# Patient Record
Sex: MALE | Race: WHITE | Employment: OTHER | URBAN - METROPOLITAN AREA
[De-identification: names, ages, dates, MRNs, and addresses within clinical notes are randomized per-mention and may not be internally consistent; named-entity substitution may affect disease eponyms.]

---

## 2017-02-02 RX ORDER — LOSARTAN POTASSIUM AND HYDROCHLOROTHIAZIDE 12.5; 5 MG/1; MG/1
1 TABLET ORAL DAILY
Qty: 90 TABLET | Refills: 1 | Status: SHIPPED | OUTPATIENT
Start: 2017-02-02 | End: 2017-05-16 | Stop reason: SDUPTHER

## 2017-02-15 ENCOUNTER — OFFICE VISIT (OUTPATIENT)
Dept: FAMILY MEDICINE CLINIC | Age: 72
End: 2017-02-15

## 2017-02-15 VITALS
DIASTOLIC BLOOD PRESSURE: 62 MMHG | OXYGEN SATURATION: 93 % | RESPIRATION RATE: 18 BRPM | SYSTOLIC BLOOD PRESSURE: 120 MMHG | WEIGHT: 253 LBS | BODY MASS INDEX: 34.27 KG/M2 | TEMPERATURE: 98.3 F | HEART RATE: 90 BPM | HEIGHT: 72 IN

## 2017-02-15 DIAGNOSIS — J01.40 ACUTE NON-RECURRENT PANSINUSITIS: Primary | ICD-10-CM

## 2017-02-15 DIAGNOSIS — J20.9 ACUTE BRONCHITIS, UNSPECIFIED ORGANISM: ICD-10-CM

## 2017-02-15 PROCEDURE — 99213 OFFICE O/P EST LOW 20 MIN: CPT | Performed by: FAMILY MEDICINE

## 2017-02-15 RX ORDER — CIPROFLOXACIN 500 MG/1
500 TABLET, FILM COATED ORAL 2 TIMES DAILY
Qty: 20 TABLET | Refills: 0 | Status: SHIPPED | OUTPATIENT
Start: 2017-02-15 | End: 2017-02-25

## 2017-02-15 RX ORDER — METHYLPREDNISOLONE 4 MG/1
TABLET ORAL
Qty: 21 TABLET | Refills: 0 | Status: SHIPPED | OUTPATIENT
Start: 2017-02-15 | End: 2017-02-21

## 2017-02-15 ASSESSMENT — ENCOUNTER SYMPTOMS
EYES NEGATIVE: 1
FACIAL SWELLING: 0
APNEA: 0
COUGH: 1
WHEEZING: 1
SHORTNESS OF BREATH: 0
CHOKING: 0
RHINORRHEA: 1
SINUS PRESSURE: 1
CHEST TIGHTNESS: 1

## 2017-05-16 ENCOUNTER — OFFICE VISIT (OUTPATIENT)
Dept: FAMILY MEDICINE CLINIC | Age: 72
End: 2017-05-16

## 2017-05-16 VITALS
OXYGEN SATURATION: 95 % | BODY MASS INDEX: 33.46 KG/M2 | WEIGHT: 247 LBS | TEMPERATURE: 98.2 F | DIASTOLIC BLOOD PRESSURE: 82 MMHG | RESPIRATION RATE: 16 BRPM | HEIGHT: 72 IN | HEART RATE: 92 BPM | SYSTOLIC BLOOD PRESSURE: 136 MMHG

## 2017-05-16 DIAGNOSIS — I10 ESSENTIAL HYPERTENSION: Primary | ICD-10-CM

## 2017-05-16 DIAGNOSIS — R73.9 HYPERGLYCEMIA: ICD-10-CM

## 2017-05-16 DIAGNOSIS — E78.5 HYPERLIPIDEMIA, UNSPECIFIED HYPERLIPIDEMIA TYPE: ICD-10-CM

## 2017-05-16 DIAGNOSIS — R10.31 RLQ ABDOMINAL PAIN: ICD-10-CM

## 2017-05-16 LAB
ALBUMIN SERPL-MCNC: 4.5 G/DL (ref 3.4–5)
ALP BLD-CCNC: 66 U/L (ref 40–129)
ALT SERPL-CCNC: 30 U/L (ref 10–40)
ANION GAP SERPL CALCULATED.3IONS-SCNC: 20 MMOL/L (ref 3–16)
AST SERPL-CCNC: 26 U/L (ref 15–37)
BILIRUB SERPL-MCNC: 0.6 MG/DL (ref 0–1)
BILIRUBIN DIRECT: <0.2 MG/DL (ref 0–0.3)
BILIRUBIN, INDIRECT: NORMAL MG/DL (ref 0–1)
BUN BLDV-MCNC: 18 MG/DL (ref 7–20)
CALCIUM SERPL-MCNC: 9.2 MG/DL (ref 8.3–10.6)
CHLORIDE BLD-SCNC: 98 MMOL/L (ref 99–110)
CHOLESTEROL, TOTAL: 198 MG/DL (ref 0–199)
CO2: 21 MMOL/L (ref 21–32)
CREAT SERPL-MCNC: 1.1 MG/DL (ref 0.8–1.3)
GFR AFRICAN AMERICAN: >60
GFR NON-AFRICAN AMERICAN: >60
GLUCOSE BLD-MCNC: 116 MG/DL (ref 70–99)
HDLC SERPL-MCNC: 54 MG/DL (ref 40–60)
LDL CHOLESTEROL CALCULATED: 122 MG/DL
POTASSIUM SERPL-SCNC: 4.3 MMOL/L (ref 3.5–5.1)
SODIUM BLD-SCNC: 139 MMOL/L (ref 136–145)
TOTAL PROTEIN: 7.4 G/DL (ref 6.4–8.2)
TRIGL SERPL-MCNC: 110 MG/DL (ref 0–150)
VLDLC SERPL CALC-MCNC: 22 MG/DL

## 2017-05-16 PROCEDURE — 99214 OFFICE O/P EST MOD 30 MIN: CPT | Performed by: FAMILY MEDICINE

## 2017-05-16 PROCEDURE — 36415 COLL VENOUS BLD VENIPUNCTURE: CPT | Performed by: FAMILY MEDICINE

## 2017-05-16 RX ORDER — LOSARTAN POTASSIUM AND HYDROCHLOROTHIAZIDE 12.5; 5 MG/1; MG/1
1 TABLET ORAL DAILY
Qty: 90 TABLET | Refills: 1 | Status: SHIPPED | OUTPATIENT
Start: 2017-05-16 | End: 2017-10-23 | Stop reason: SDUPTHER

## 2017-05-16 RX ORDER — NIFEDIPINE 90 MG/1
TABLET, EXTENDED RELEASE ORAL
Qty: 90 TABLET | Refills: 1 | Status: SHIPPED | OUTPATIENT
Start: 2017-05-16 | End: 2017-10-23 | Stop reason: SDUPTHER

## 2017-05-16 RX ORDER — GUAIFENESIN AND CODEINE PHOSPHATE 100; 10 MG/5ML; MG/5ML
5 SOLUTION ORAL 3 TIMES DAILY PRN
COMMUNITY
End: 2018-01-12

## 2017-10-12 ENCOUNTER — OFFICE VISIT (OUTPATIENT)
Dept: FAMILY MEDICINE CLINIC | Age: 72
End: 2017-10-12

## 2017-10-12 VITALS
DIASTOLIC BLOOD PRESSURE: 74 MMHG | HEIGHT: 72 IN | WEIGHT: 245 LBS | BODY MASS INDEX: 33.18 KG/M2 | SYSTOLIC BLOOD PRESSURE: 122 MMHG | HEART RATE: 76 BPM

## 2017-10-12 DIAGNOSIS — J06.9 UPPER RESPIRATORY TRACT INFECTION, UNSPECIFIED TYPE: Primary | ICD-10-CM

## 2017-10-12 DIAGNOSIS — J40 BRONCHITIS: ICD-10-CM

## 2017-10-12 PROCEDURE — 99213 OFFICE O/P EST LOW 20 MIN: CPT | Performed by: FAMILY MEDICINE

## 2017-10-12 RX ORDER — GUAIFENESIN AND CODEINE PHOSPHATE 100; 10 MG/5ML; MG/5ML
SOLUTION ORAL
Qty: 240 ML | Refills: 1 | Status: SHIPPED | OUTPATIENT
Start: 2017-10-12 | End: 2017-10-19

## 2017-10-12 RX ORDER — CIPROFLOXACIN 500 MG/1
500 TABLET, FILM COATED ORAL 2 TIMES DAILY
Qty: 20 TABLET | Refills: 0 | Status: SHIPPED | OUTPATIENT
Start: 2017-10-12 | End: 2017-10-22

## 2017-10-12 RX ORDER — CYCLOBENZAPRINE HCL 10 MG
TABLET ORAL
Qty: 30 TABLET | Refills: 4 | Status: SHIPPED | OUTPATIENT
Start: 2017-10-12 | End: 2018-12-10

## 2017-10-12 RX ORDER — GUAIFENESIN AND CODEINE PHOSPHATE 100; 10 MG/5ML; MG/5ML
5 SOLUTION ORAL 3 TIMES DAILY PRN
Refills: 0 | Status: CANCELLED | OUTPATIENT
Start: 2017-10-12

## 2017-10-13 ENCOUNTER — TELEPHONE (OUTPATIENT)
Dept: FAMILY MEDICINE CLINIC | Age: 72
End: 2017-10-13

## 2017-10-23 RX ORDER — NIFEDIPINE 90 MG/1
TABLET, EXTENDED RELEASE ORAL
Qty: 90 TABLET | Refills: 1 | Status: SHIPPED | OUTPATIENT
Start: 2017-10-23 | End: 2017-11-16 | Stop reason: SDUPTHER

## 2017-10-23 RX ORDER — LOSARTAN POTASSIUM AND HYDROCHLOROTHIAZIDE 12.5; 5 MG/1; MG/1
1 TABLET ORAL DAILY
Qty: 90 TABLET | Refills: 1 | Status: SHIPPED | OUTPATIENT
Start: 2017-10-23 | End: 2017-11-16 | Stop reason: SDUPTHER

## 2017-11-16 ENCOUNTER — OFFICE VISIT (OUTPATIENT)
Dept: FAMILY MEDICINE CLINIC | Age: 72
End: 2017-11-16

## 2017-11-16 VITALS
DIASTOLIC BLOOD PRESSURE: 80 MMHG | BODY MASS INDEX: 33.18 KG/M2 | HEART RATE: 81 BPM | RESPIRATION RATE: 16 BRPM | OXYGEN SATURATION: 98 % | WEIGHT: 245 LBS | HEIGHT: 72 IN | SYSTOLIC BLOOD PRESSURE: 136 MMHG

## 2017-11-16 DIAGNOSIS — E78.5 HYPERLIPIDEMIA, UNSPECIFIED HYPERLIPIDEMIA TYPE: ICD-10-CM

## 2017-11-16 DIAGNOSIS — R73.9 HYPERGLYCEMIA: ICD-10-CM

## 2017-11-16 DIAGNOSIS — R35.1 NOCTURIA: ICD-10-CM

## 2017-11-16 DIAGNOSIS — I10 ESSENTIAL HYPERTENSION: Primary | ICD-10-CM

## 2017-11-16 DIAGNOSIS — Z11.59 NEED FOR HEPATITIS C SCREENING TEST: ICD-10-CM

## 2017-11-16 LAB
ALBUMIN SERPL-MCNC: 4.5 G/DL (ref 3.4–5)
ALP BLD-CCNC: 78 U/L (ref 40–129)
ALT SERPL-CCNC: 31 U/L (ref 10–40)
ANION GAP SERPL CALCULATED.3IONS-SCNC: 22 MMOL/L (ref 3–16)
AST SERPL-CCNC: 29 U/L (ref 15–37)
BASOPHILS ABSOLUTE: 0.1 K/UL (ref 0–0.2)
BASOPHILS RELATIVE PERCENT: 0.8 %
BILIRUB SERPL-MCNC: 0.6 MG/DL (ref 0–1)
BILIRUBIN DIRECT: <0.2 MG/DL (ref 0–0.3)
BILIRUBIN, INDIRECT: NORMAL MG/DL (ref 0–1)
BUN BLDV-MCNC: 16 MG/DL (ref 7–20)
CALCIUM SERPL-MCNC: 9.7 MG/DL (ref 8.3–10.6)
CHLORIDE BLD-SCNC: 99 MMOL/L (ref 99–110)
CHOLESTEROL, TOTAL: 207 MG/DL (ref 0–199)
CO2: 22 MMOL/L (ref 21–32)
CREAT SERPL-MCNC: 1 MG/DL (ref 0.8–1.3)
EOSINOPHILS ABSOLUTE: 0.3 K/UL (ref 0–0.6)
EOSINOPHILS RELATIVE PERCENT: 2.8 %
GFR AFRICAN AMERICAN: >60
GFR NON-AFRICAN AMERICAN: >60
GLUCOSE BLD-MCNC: 116 MG/DL (ref 70–99)
HCT VFR BLD CALC: 46.2 % (ref 40.5–52.5)
HDLC SERPL-MCNC: 54 MG/DL (ref 40–60)
HEMOGLOBIN: 15.1 G/DL (ref 13.5–17.5)
HEPATITIS C ANTIBODY INTERPRETATION: NORMAL
LDL CHOLESTEROL CALCULATED: 130 MG/DL
LYMPHOCYTES ABSOLUTE: 2.1 K/UL (ref 1–5.1)
LYMPHOCYTES RELATIVE PERCENT: 24.1 %
MCH RBC QN AUTO: 28 PG (ref 26–34)
MCHC RBC AUTO-ENTMCNC: 32.6 G/DL (ref 31–36)
MCV RBC AUTO: 85.7 FL (ref 80–100)
MONOCYTES ABSOLUTE: 0.9 K/UL (ref 0–1.3)
MONOCYTES RELATIVE PERCENT: 10.4 %
NEUTROPHILS ABSOLUTE: 5.5 K/UL (ref 1.7–7.7)
NEUTROPHILS RELATIVE PERCENT: 61.9 %
PDW BLD-RTO: 16.6 % (ref 12.4–15.4)
PLATELET # BLD: 387 K/UL (ref 135–450)
PMV BLD AUTO: 8.5 FL (ref 5–10.5)
POTASSIUM SERPL-SCNC: 4 MMOL/L (ref 3.5–5.1)
PROSTATE SPECIFIC ANTIGEN: 0.68 NG/ML (ref 0–4)
RBC # BLD: 5.39 M/UL (ref 4.2–5.9)
SODIUM BLD-SCNC: 143 MMOL/L (ref 136–145)
TOTAL PROTEIN: 7.5 G/DL (ref 6.4–8.2)
TRIGL SERPL-MCNC: 113 MG/DL (ref 0–150)
VLDLC SERPL CALC-MCNC: 23 MG/DL
WBC # BLD: 8.9 K/UL (ref 4–11)

## 2017-11-16 PROCEDURE — 99214 OFFICE O/P EST MOD 30 MIN: CPT | Performed by: FAMILY MEDICINE

## 2017-11-16 PROCEDURE — 36415 COLL VENOUS BLD VENIPUNCTURE: CPT | Performed by: FAMILY MEDICINE

## 2017-11-16 RX ORDER — LOSARTAN POTASSIUM AND HYDROCHLOROTHIAZIDE 12.5; 5 MG/1; MG/1
1 TABLET ORAL DAILY
Qty: 90 TABLET | Refills: 1 | Status: SHIPPED | OUTPATIENT
Start: 2017-11-16 | End: 2018-05-03 | Stop reason: SDUPTHER

## 2017-11-16 RX ORDER — NIFEDIPINE 90 MG/1
TABLET, EXTENDED RELEASE ORAL
Qty: 90 TABLET | Refills: 1 | Status: SHIPPED | OUTPATIENT
Start: 2017-11-16 | End: 2018-05-03 | Stop reason: SDUPTHER

## 2017-11-16 ASSESSMENT — PATIENT HEALTH QUESTIONNAIRE - PHQ9
1. LITTLE INTEREST OR PLEASURE IN DOING THINGS: 0
SUM OF ALL RESPONSES TO PHQ QUESTIONS 1-9: 0
2. FEELING DOWN, DEPRESSED OR HOPELESS: 0
SUM OF ALL RESPONSES TO PHQ9 QUESTIONS 1 & 2: 0

## 2017-11-17 ENCOUNTER — OFFICE VISIT (OUTPATIENT)
Dept: FAMILY MEDICINE CLINIC | Age: 72
End: 2017-11-17

## 2017-11-17 VITALS
WEIGHT: 236 LBS | TEMPERATURE: 98.3 F | DIASTOLIC BLOOD PRESSURE: 80 MMHG | OXYGEN SATURATION: 96 % | RESPIRATION RATE: 16 BRPM | SYSTOLIC BLOOD PRESSURE: 130 MMHG | HEART RATE: 96 BPM | HEIGHT: 72 IN | BODY MASS INDEX: 31.97 KG/M2

## 2017-11-17 DIAGNOSIS — I10 ESSENTIAL HYPERTENSION: Primary | ICD-10-CM

## 2017-11-17 LAB
BILIRUBIN, POC: NORMAL
BLOOD URINE, POC: NORMAL
CLARITY, POC: CLEAR
COLOR, POC: YELLOW
ESTIMATED AVERAGE GLUCOSE: 137 MG/DL
GLUCOSE URINE, POC: NORMAL
HBA1C MFR BLD: 6.4 %
KETONES, POC: NORMAL
LEUKOCYTE EST, POC: NORMAL
NITRITE, POC: NORMAL
PH, POC: 7
PROTEIN, POC: NORMAL
SPECIFIC GRAVITY, POC: 1.01
UROBILINOGEN, POC: 1

## 2017-11-17 PROCEDURE — 99999 PR OFFICE/OUTPT VISIT,PROCEDURE ONLY: CPT | Performed by: FAMILY MEDICINE

## 2017-11-17 PROCEDURE — 81002 URINALYSIS NONAUTO W/O SCOPE: CPT | Performed by: FAMILY MEDICINE

## 2017-11-17 PROCEDURE — 93000 ELECTROCARDIOGRAM COMPLETE: CPT | Performed by: FAMILY MEDICINE

## 2017-11-17 NOTE — PROGRESS NOTES
SUBJECTIVE:  Here for general PE. He had blood tests yesterday. Patient Active Problem List   Diagnosis    HTN (hypertension)    Hyperlipidemia    Nocturia    Hyperglycemia    AR (allergic rhinitis)       Current Outpatient Prescriptions   Medication Sig Dispense Refill    NIFEdipine (PROCARDIA XL) 90 MG extended release tablet TAKE 1 TABLET BY MOUTH DAILY 90 tablet 1    losartan-hydrochlorothiazide (HYZAAR) 50-12.5 MG per tablet Take 1 tablet by mouth daily 90 tablet 1    cyclobenzaprine (FLEXERIL) 10 MG tablet TAKE 1 TABLET BY MOUTH 3 TIMES DAILY AS NEEDED 30 tablet 4    albuterol sulfate HFA (PROVENTIL HFA) 108 (90 Base) MCG/ACT inhaler Inhale 2 puffs into the lungs every 4 hours as needed for Wheezing 1 Inhaler 0    guaiFENesin-codeine (CHERATUSSIN AC) 100-10 MG/5ML syrup Take 5 mLs by mouth 3 times daily as needed for Cough      halobetasol (ULTRAVATE) 0.05 % cream Apply topically 2 times daily Apply topically 2 times daily.  NONFORMULARY Take 2 tablets by mouth nightly Legatrin PM      Glucosamine-Chondroit-Collagen 125-100-10 MG TABS Take by mouth      fluticasone (FLONASE) 50 MCG/ACT nasal spray 1 spray by Nasal route daily      Probiotic Product (SOLUBLE FIBER/PROBIOTICS PO) Take 1 capsule by mouth daily.  Naproxen Sodium (ALEVE) 220 MG CAPS Take  by mouth every 8 hours as needed for Pain.  diphenhydrAMINE (BENADRYL) 25 MG capsule Take 25 mg by mouth every 6 hours as needed for Itching.  hydrocortisone (ANUSOL-HC) 2.5 % rectal cream Place  rectally 2 times daily. Place rectally 2 times daily.  fexofenadine (ALLEGRA ALLERGY) 180 MG tablet Take 180 mg by mouth daily.  POTASSIUM GLUCONATE Take 1 tablet by mouth daily.  aspirin-acetaminophen-caffeine (EXCEDRIN MIGRAINE) 250-250-65 MG per tablet Take 1 tablet by mouth every 6 hours as needed.  aspirin EC 81 MG EC tablet Take 81 mg by mouth daily.         therapeutic multivitamin-minerals (THERAGRAN-M) chiropractor once a month. Skin: No new or significant lesions noted. Lymph: No enlarged lymph nodes noted. Psych: No anxiety or depression. Neuro: He denies dizziness, memory problems, or tremor. Physical Exam:    /80 (Site: Left Arm, Position: Sitting, Cuff Size: Large Adult)   Pulse 96   Temp 98.3 °F (36.8 °C) (Oral)   Resp 16   Ht 6' (1.829 m)   Wt 236 lb (107 kg)   SpO2 96%   BMI 32.01 kg/m²     GEN: He appears well developed and well nourished. No distress. Head: Normocephalic. Eyes: Sclerae clear. Pupils equal, round, and reactive to light. ENT: Pharynx clear. Saw his ENT last month. Neck: No abnormal mass detected. No thyromegaly, bruits, or venous distension. Heart: Regular rhythm without murmur, gallop, or rub. Lungs: Clear to auscultation. Abdomen: No tenderness or mass. Liver, kidneys and spleen not felt. : No hernia or testicular masses. Rectal: Prostate normal size without tenderness or nodules. Extremities: No edema, cyanosis, or clubbing. No joint inflammation. Distal pulses full bilaterally. Skin: No significant lesions noted. Lymphatics: No enlarged lymph nodes palpated. Neuro: No focal deficit. Motor function normal in all extremities. Psych: Alert, oriented X 3. Normal mood and affect. ASSESSMENT:  1. Annual physical exam        PLAN:  Orders Placed This Encounter   Procedures    POCT Urinalysis no Micro    EKG 12 Lead       Blood test results from yesterday discussed. EKG discussed - no significant change.

## 2017-11-20 ENCOUNTER — TELEPHONE (OUTPATIENT)
Dept: FAMILY MEDICINE CLINIC | Age: 72
End: 2017-11-20

## 2017-11-20 NOTE — TELEPHONE ENCOUNTER
Date Of Service: 11-17-17    Per Billing:    \"[facility] does not allow preventatives please use the appropriate G code if the documentation supports the G code otherwise remove the Z00.00 from the primary DX and change the Preventative to a problem focused visit\"      For questions or clarification please call Becca Pagan at [phone]  in the billing office. Please let me know when complete by sending the telephone encounter back with a message.      Thank you,     Anabella Low

## 2018-01-12 ENCOUNTER — OFFICE VISIT (OUTPATIENT)
Dept: FAMILY MEDICINE CLINIC | Age: 73
End: 2018-01-12

## 2018-01-12 VITALS
RESPIRATION RATE: 16 BRPM | TEMPERATURE: 98.2 F | HEART RATE: 104 BPM | BODY MASS INDEX: 32.32 KG/M2 | OXYGEN SATURATION: 96 % | HEIGHT: 72 IN | DIASTOLIC BLOOD PRESSURE: 76 MMHG | SYSTOLIC BLOOD PRESSURE: 118 MMHG | WEIGHT: 238.6 LBS

## 2018-01-12 DIAGNOSIS — E78.2 MIXED HYPERLIPIDEMIA: ICD-10-CM

## 2018-01-12 DIAGNOSIS — K22.70 BARRETT'S ESOPHAGUS WITHOUT DYSPLASIA: ICD-10-CM

## 2018-01-12 DIAGNOSIS — I10 ESSENTIAL HYPERTENSION: Primary | ICD-10-CM

## 2018-01-12 DIAGNOSIS — J30.89 CHRONIC NON-SEASONAL ALLERGIC RHINITIS, UNSPECIFIED TRIGGER: ICD-10-CM

## 2018-01-12 PROCEDURE — 99213 OFFICE O/P EST LOW 20 MIN: CPT | Performed by: NURSE PRACTITIONER

## 2018-01-12 ASSESSMENT — ENCOUNTER SYMPTOMS
BACK PAIN: 0
NAUSEA: 0
SHORTNESS OF BREATH: 0
CHEST TIGHTNESS: 0
CONSTIPATION: 0

## 2018-01-12 NOTE — PROGRESS NOTES
Subjective:      Patient ID: Demi Alexis is a 81 y.o. male. HPI     To establish care at this practice. Previous PCP Dr. Dia Cagle. Active with [facility]/Delmar Gottlieb    Review of Systems   Constitutional: Negative for appetite change. Respiratory: Negative for chest tightness and shortness of breath. Cardiovascular: Negative for chest pain. Gastrointestinal: Negative for constipation and nausea. Musculoskeletal: Negative for arthralgias, back pain and gait problem. Neurological: Negative for dizziness and headaches. Psychiatric/Behavioral: Negative. Objective:   Physical Exam   Constitutional: He is oriented to person, place, and time. He appears well-developed and well-nourished. No distress. HENT:   Head: Normocephalic and atraumatic. Neck: Normal range of motion. Neck supple. No thyromegaly present. Cardiovascular: Normal rate, regular rhythm and normal heart sounds. Pulmonary/Chest: Effort normal and breath sounds normal. No respiratory distress. Abdominal: Soft. Bowel sounds are normal. He exhibits no distension. Musculoskeletal: Normal range of motion. Neurological: He is alert and oriented to person, place, and time. Skin: Skin is warm and dry. Psychiatric: He has a normal mood and affect. Current Outpatient Prescriptions   Medication Sig Dispense Refill    NIFEdipine (PROCARDIA XL) 90 MG extended release tablet TAKE 1 TABLET BY MOUTH DAILY 90 tablet 1    losartan-hydrochlorothiazide (HYZAAR) 50-12.5 MG per tablet Take 1 tablet by mouth daily 90 tablet 1    cyclobenzaprine (FLEXERIL) 10 MG tablet TAKE 1 TABLET BY MOUTH 3 TIMES DAILY AS NEEDED 30 tablet 4    Probiotic Product (SOLUBLE FIBER/PROBIOTICS PO) Take 1 capsule by mouth daily.  POTASSIUM GLUCONATE Take 1 tablet by mouth daily.  aspirin EC 81 MG EC tablet Take 81 mg by mouth daily.  Multiple Vitamins-Minerals (VISION VITAMINS PO) Take 1 capsule by mouth daily.         esomeprazole (NEXIUM) 40 MG capsule Take 40 mg by mouth every morning (before breakfast).  diphenhydrAMINE (BENADRYL) 25 MG capsule Take 25 mg by mouth every 6 hours as needed for Itching.  fexofenadine (ALLEGRA ALLERGY) 180 MG tablet Take 180 mg by mouth daily.  aspirin-acetaminophen-caffeine (EXCEDRIN MIGRAINE) 250-250-65 MG per tablet Take 1 tablet by mouth every 6 hours as needed. No current facility-administered medications for this visit. Assessment:      1. IFG-labile  2. HTN  3. Hyperlipidemia  4. Geller's esophagus  5. Allergic rhinitis      Plan:      1. Muna Reina was seen today for new patient. Diagnoses and all orders for this visit:    Essential hypertension    Mixed hyperlipidemia    Chronic non-seasonal allergic rhinitis, unspecified trigger    Geller's esophagus without dysplasia  -     EGD  -     External Referral To Gastroenterology    No change in medication. Discussed elevated glucose. At level for diagnosis of DM. Discussed need to decrease weight. Plans discussed.      Will see back in 6 months for follow up HgA1c

## 2018-05-03 ENCOUNTER — OFFICE VISIT (OUTPATIENT)
Dept: FAMILY MEDICINE CLINIC | Age: 73
End: 2018-05-03

## 2018-05-03 VITALS
WEIGHT: 237.8 LBS | RESPIRATION RATE: 18 BRPM | SYSTOLIC BLOOD PRESSURE: 128 MMHG | OXYGEN SATURATION: 96 % | HEART RATE: 104 BPM | HEIGHT: 72 IN | BODY MASS INDEX: 32.21 KG/M2 | DIASTOLIC BLOOD PRESSURE: 82 MMHG

## 2018-05-03 DIAGNOSIS — K22.70 BARRETT'S ESOPHAGUS WITHOUT DYSPLASIA: ICD-10-CM

## 2018-05-03 DIAGNOSIS — I10 ESSENTIAL HYPERTENSION: ICD-10-CM

## 2018-05-03 DIAGNOSIS — L20.84 INTRINSIC ECZEMA: ICD-10-CM

## 2018-05-03 DIAGNOSIS — R73.9 HYPERGLYCEMIA: Primary | ICD-10-CM

## 2018-05-03 DIAGNOSIS — Z23 NEED FOR SHINGLES VACCINE: ICD-10-CM

## 2018-05-03 DIAGNOSIS — E78.2 MIXED HYPERLIPIDEMIA: ICD-10-CM

## 2018-05-03 LAB
ALBUMIN SERPL-MCNC: 4.4 G/DL (ref 3.4–5)
ALP BLD-CCNC: 82 U/L (ref 40–129)
ALT SERPL-CCNC: 24 U/L (ref 10–40)
AST SERPL-CCNC: 20 U/L (ref 15–37)
BILIRUB SERPL-MCNC: 0.4 MG/DL (ref 0–1)
BILIRUBIN DIRECT: <0.2 MG/DL (ref 0–0.3)
BILIRUBIN, INDIRECT: NORMAL MG/DL (ref 0–1)
CHOLESTEROL, TOTAL: 186 MG/DL (ref 0–199)
HBA1C MFR BLD: 6.4 %
HDLC SERPL-MCNC: 57 MG/DL (ref 40–60)
LDL CHOLESTEROL CALCULATED: 116 MG/DL
TOTAL PROTEIN: 7.4 G/DL (ref 6.4–8.2)
TRIGL SERPL-MCNC: 64 MG/DL (ref 0–150)
VLDLC SERPL CALC-MCNC: 13 MG/DL

## 2018-05-03 PROCEDURE — 99213 OFFICE O/P EST LOW 20 MIN: CPT | Performed by: NURSE PRACTITIONER

## 2018-05-03 PROCEDURE — 83036 HEMOGLOBIN GLYCOSYLATED A1C: CPT | Performed by: NURSE PRACTITIONER

## 2018-05-03 RX ORDER — NIFEDIPINE 90 MG/1
TABLET, EXTENDED RELEASE ORAL
Qty: 90 TABLET | Refills: 1 | Status: SHIPPED | OUTPATIENT
Start: 2018-05-03 | End: 2018-11-02 | Stop reason: SDUPTHER

## 2018-05-03 RX ORDER — TRIAMCINOLONE ACETONIDE 1 MG/G
CREAM TOPICAL
Qty: 45 G | Refills: 1 | Status: SHIPPED | OUTPATIENT
Start: 2018-05-03 | End: 2018-12-04

## 2018-05-03 RX ORDER — LOSARTAN POTASSIUM AND HYDROCHLOROTHIAZIDE 12.5; 5 MG/1; MG/1
1 TABLET ORAL DAILY
Qty: 90 TABLET | Refills: 1 | Status: SHIPPED | OUTPATIENT
Start: 2018-05-03 | End: 2018-11-05

## 2018-05-03 ASSESSMENT — ENCOUNTER SYMPTOMS
BACK PAIN: 0
CHEST TIGHTNESS: 0
CONSTIPATION: 0
SHORTNESS OF BREATH: 0
NAUSEA: 0

## 2018-05-03 ASSESSMENT — PATIENT HEALTH QUESTIONNAIRE - PHQ9
SUM OF ALL RESPONSES TO PHQ9 QUESTIONS 1 & 2: 0
SUM OF ALL RESPONSES TO PHQ QUESTIONS 1-9: 0
1. LITTLE INTEREST OR PLEASURE IN DOING THINGS: 0
2. FEELING DOWN, DEPRESSED OR HOPELESS: 0

## 2018-08-02 ENCOUNTER — OFFICE VISIT (OUTPATIENT)
Dept: FAMILY MEDICINE CLINIC | Age: 73
End: 2018-08-02

## 2018-08-02 VITALS
HEIGHT: 72 IN | SYSTOLIC BLOOD PRESSURE: 132 MMHG | RESPIRATION RATE: 17 BRPM | WEIGHT: 224 LBS | DIASTOLIC BLOOD PRESSURE: 84 MMHG | HEART RATE: 84 BPM | BODY MASS INDEX: 30.34 KG/M2 | OXYGEN SATURATION: 97 %

## 2018-08-02 DIAGNOSIS — R73.01 IMPAIRED FASTING GLUCOSE: Primary | ICD-10-CM

## 2018-08-02 DIAGNOSIS — E78.2 MIXED HYPERLIPIDEMIA: ICD-10-CM

## 2018-08-02 DIAGNOSIS — I10 ESSENTIAL HYPERTENSION: ICD-10-CM

## 2018-08-02 LAB — HBA1C MFR BLD: 5.7 %

## 2018-08-02 PROCEDURE — 99213 OFFICE O/P EST LOW 20 MIN: CPT | Performed by: NURSE PRACTITIONER

## 2018-08-02 PROCEDURE — 83036 HEMOGLOBIN GLYCOSYLATED A1C: CPT | Performed by: NURSE PRACTITIONER

## 2018-08-02 PROCEDURE — G8510 SCR DEP NEG, NO PLAN REQD: HCPCS | Performed by: NURSE PRACTITIONER

## 2018-08-02 ASSESSMENT — PATIENT HEALTH QUESTIONNAIRE - PHQ9
SUM OF ALL RESPONSES TO PHQ9 QUESTIONS 1 & 2: 0
1. LITTLE INTEREST OR PLEASURE IN DOING THINGS: 0
2. FEELING DOWN, DEPRESSED OR HOPELESS: 0
SUM OF ALL RESPONSES TO PHQ QUESTIONS 1-9: 0

## 2018-08-02 ASSESSMENT — ENCOUNTER SYMPTOMS
CONSTIPATION: 0
CHEST TIGHTNESS: 0
COUGH: 0
BACK PAIN: 0

## 2018-08-02 NOTE — PROGRESS NOTES
Subjective:      Patient ID: Ruiz Dave is a 81 y.o. male. HPI     For routine follow up DM. Weight now down 14 pounds over past 3 months. Added 20 extra minutes workout 3 days weekly. Also working with . Continues to have rash on back. Improved, less itching with creme. Lesions remain    3 weeks ago started with \"bites on legs. Using kenalog creme on them as well. Review of Systems   Constitutional: Negative for appetite change and unexpected weight change. Respiratory: Negative for cough and chest tightness. Cardiovascular: Negative for chest pain and palpitations. Gastrointestinal: Negative for constipation. Musculoskeletal: Negative for arthralgias, back pain and gait problem. Skin: Positive for wound. Neurological: Negative for headaches. Psychiatric/Behavioral: Negative. Objective:   Physical Exam   Constitutional: He is oriented to person, place, and time. He appears well-developed and well-nourished. No distress. Cardiovascular: Normal rate and normal heart sounds. Pulmonary/Chest: Effort normal and breath sounds normal.   Abdominal: Soft. Bowel sounds are normal.   Musculoskeletal: Normal range of motion. Neurological: He is alert and oriented to person, place, and time. Skin: Skin is warm and dry. Upper back with scattered slightly raised patches, natural color, slight rough, neg for plaque. IMproved from previous. Bilateral lower legs with 4 scattered red area surrounding hair follicle. Psychiatric: He has a normal mood and affect. Current Outpatient Prescriptions   Medication Sig Dispense Refill    NIFEdipine (PROCARDIA XL) 90 MG extended release tablet TAKE 1 TABLET BY MOUTH DAILY 90 tablet 1    losartan-hydrochlorothiazide (HYZAAR) 50-12.5 MG per tablet Take 1 tablet by mouth daily 90 tablet 1    triamcinolone (KENALOG) 0.1 % cream Apply topically 2 times daily.  45 g 1    Probiotic Product (SOLUBLE FIBER/PROBIOTICS PO) Take 1 capsule by mouth daily.  fexofenadine (ALLEGRA ALLERGY) 180 MG tablet Take 180 mg by mouth daily.  POTASSIUM GLUCONATE Take 1 tablet by mouth daily.  aspirin EC 81 MG EC tablet Take 81 mg by mouth daily.  Multiple Vitamins-Minerals (VISION VITAMINS PO) Take 1 capsule by mouth daily.  esomeprazole (NEXIUM) 40 MG capsule Take 40 mg by mouth every morning (before breakfast).  cyclobenzaprine (FLEXERIL) 10 MG tablet TAKE 1 TABLET BY MOUTH 3 TIMES DAILY AS NEEDED 30 tablet 4    diphenhydrAMINE (BENADRYL) 25 MG capsule Take 25 mg by mouth every 6 hours as needed for Itching.  aspirin-acetaminophen-caffeine (EXCEDRIN MIGRAINE) 250-250-65 MG per tablet Take 1 tablet by mouth every 6 hours as needed. No current facility-administered medications for this visit. Assessment:      1. DM-stable  2. HTN-stable  3. Obesity-improved  4. Dermatitis-labile--eczema/foluliculitis       Plan:      1. Encouraged to continue with weight loss and routine activity. 2. Continue kenalog to areas of concern. 3. No change in medication    4. Will return in November for Medicare wellness exam.     5. Labs reviewed.

## 2018-10-17 ENCOUNTER — OFFICE VISIT (OUTPATIENT)
Dept: FAMILY MEDICINE CLINIC | Age: 73
End: 2018-10-17
Payer: COMMERCIAL

## 2018-10-17 VITALS
OXYGEN SATURATION: 98 % | HEIGHT: 72 IN | RESPIRATION RATE: 16 BRPM | SYSTOLIC BLOOD PRESSURE: 120 MMHG | DIASTOLIC BLOOD PRESSURE: 78 MMHG | HEART RATE: 90 BPM | BODY MASS INDEX: 30.26 KG/M2 | WEIGHT: 223.4 LBS

## 2018-10-17 DIAGNOSIS — M25.461 EFFUSION OF RIGHT KNEE: ICD-10-CM

## 2018-10-17 DIAGNOSIS — M79.10 MYALGIA: ICD-10-CM

## 2018-10-17 DIAGNOSIS — M79.10 MYALGIA: Primary | ICD-10-CM

## 2018-10-17 LAB
C-REACTIVE PROTEIN: 33 MG/L (ref 0–5.1)
RHEUMATOID FACTOR: <10 IU/ML

## 2018-10-17 PROCEDURE — 99213 OFFICE O/P EST LOW 20 MIN: CPT | Performed by: NURSE PRACTITIONER

## 2018-10-17 PROCEDURE — G8510 SCR DEP NEG, NO PLAN REQD: HCPCS | Performed by: NURSE PRACTITIONER

## 2018-10-17 RX ORDER — DICLOFENAC SODIUM 75 MG/1
75 TABLET, DELAYED RELEASE ORAL 2 TIMES DAILY
Qty: 60 TABLET | Refills: 3 | Status: SHIPPED | OUTPATIENT
Start: 2018-10-17 | End: 2018-12-10

## 2018-10-17 ASSESSMENT — PATIENT HEALTH QUESTIONNAIRE - PHQ9
SUM OF ALL RESPONSES TO PHQ QUESTIONS 1-9: 0
1. LITTLE INTEREST OR PLEASURE IN DOING THINGS: 0
SUM OF ALL RESPONSES TO PHQ9 QUESTIONS 1 & 2: 0
SUM OF ALL RESPONSES TO PHQ QUESTIONS 1-9: 0
2. FEELING DOWN, DEPRESSED OR HOPELESS: 0

## 2018-10-17 ASSESSMENT — ENCOUNTER SYMPTOMS
CHEST TIGHTNESS: 0
BACK PAIN: 0
COUGH: 0
VOMITING: 0
NAUSEA: 0
DIARRHEA: 0
SHORTNESS OF BREATH: 0

## 2018-10-17 NOTE — PROGRESS NOTES
1    triamcinolone (KENALOG) 0.1 % cream Apply topically 2 times daily. 45 g 1    Probiotic Product (SOLUBLE FIBER/PROBIOTICS PO) Take 1 capsule by mouth daily.  fexofenadine (ALLEGRA ALLERGY) 180 MG tablet Take 180 mg by mouth daily.  POTASSIUM GLUCONATE Take 1 tablet by mouth daily.  aspirin EC 81 MG EC tablet Take 81 mg by mouth daily.  Multiple Vitamins-Minerals (VISION VITAMINS PO) Take 1 capsule by mouth daily.  esomeprazole (NEXIUM) 40 MG capsule Take 40 mg by mouth every morning (before breakfast).  cyclobenzaprine (FLEXERIL) 10 MG tablet TAKE 1 TABLET BY MOUTH 3 TIMES DAILY AS NEEDED 30 tablet 4     No current facility-administered medications on file prior to visit. Assessment:      1. Effusion right knee  2. Myalgia        Plan:      1. Kay Tse was seen today for muscle pain. Diagnoses and all orders for this visit:    Effusion of right knee  -     diclofenac (VOLTAREN) 75 MG EC tablet; Take 1 tablet by mouth 2 times daily    Myalgia  -     diclofenac (VOLTAREN) 75 MG EC tablet; Take 1 tablet by mouth 2 times daily  -     TONY  -     C-reactive protein  -     Rheumatoid Factor    2. Apply ice pack 4 times daily for 15-20 minutes. Wrap in towel, etc to avoid the coldness directly to the skin.               ABIGAIL HAWTHORNE, JAY JAY - CNP

## 2018-10-18 DIAGNOSIS — M35.3 POLYMYALGIA RHEUMATICA (HCC): ICD-10-CM

## 2018-10-18 LAB
ANA INTERPRETATION: NORMAL
ANTI-NUCLEAR ANTIBODY (ANA): NEGATIVE

## 2018-10-18 RX ORDER — PREDNISONE 10 MG/1
TABLET ORAL
Qty: 35 TABLET | Refills: 0 | Status: SHIPPED | OUTPATIENT
Start: 2018-10-18 | End: 2018-11-28 | Stop reason: CLARIF

## 2018-11-02 DIAGNOSIS — I10 ESSENTIAL HYPERTENSION: ICD-10-CM

## 2018-11-02 RX ORDER — NIFEDIPINE 90 MG/1
TABLET, EXTENDED RELEASE ORAL
Qty: 90 TABLET | Refills: 1 | Status: SHIPPED | OUTPATIENT
Start: 2018-11-02 | End: 2019-05-17

## 2018-11-28 ENCOUNTER — OFFICE VISIT (OUTPATIENT)
Dept: FAMILY MEDICINE CLINIC | Age: 73
End: 2018-11-28
Payer: COMMERCIAL

## 2018-11-28 VITALS
DIASTOLIC BLOOD PRESSURE: 82 MMHG | WEIGHT: 226 LBS | RESPIRATION RATE: 18 BRPM | HEART RATE: 88 BPM | SYSTOLIC BLOOD PRESSURE: 134 MMHG | HEIGHT: 72 IN | OXYGEN SATURATION: 97 % | BODY MASS INDEX: 30.61 KG/M2

## 2018-11-28 DIAGNOSIS — Z12.5 PROSTATE CANCER SCREENING: ICD-10-CM

## 2018-11-28 DIAGNOSIS — M94.9 DISORDER OF CARTILAGE: ICD-10-CM

## 2018-11-28 DIAGNOSIS — G89.29 CHRONIC PAIN OF RIGHT KNEE: ICD-10-CM

## 2018-11-28 DIAGNOSIS — M35.3 POLYMYALGIA RHEUMATICA (HCC): ICD-10-CM

## 2018-11-28 DIAGNOSIS — E78.2 MIXED HYPERLIPIDEMIA: ICD-10-CM

## 2018-11-28 DIAGNOSIS — I65.23 ATHEROSCLEROSIS OF BOTH CAROTID ARTERIES: ICD-10-CM

## 2018-11-28 DIAGNOSIS — Z00.00 ENCOUNTER FOR MEDICARE ANNUAL WELLNESS EXAM: Primary | ICD-10-CM

## 2018-11-28 DIAGNOSIS — Z00.00 ROUTINE GENERAL MEDICAL EXAMINATION AT A HEALTH CARE FACILITY: ICD-10-CM

## 2018-11-28 DIAGNOSIS — M25.561 CHRONIC PAIN OF RIGHT KNEE: ICD-10-CM

## 2018-11-28 LAB
A/G RATIO: 1.3 (ref 1.1–2.2)
ALBUMIN SERPL-MCNC: 4.2 G/DL (ref 3.4–5)
ALP BLD-CCNC: 85 U/L (ref 40–129)
ALT SERPL-CCNC: 18 U/L (ref 10–40)
ANION GAP SERPL CALCULATED.3IONS-SCNC: 17 MMOL/L (ref 3–16)
AST SERPL-CCNC: 17 U/L (ref 15–37)
BASOPHILS ABSOLUTE: 0.1 K/UL (ref 0–0.2)
BASOPHILS RELATIVE PERCENT: 1 %
BILIRUB SERPL-MCNC: 0.4 MG/DL (ref 0–1)
BUN BLDV-MCNC: 17 MG/DL (ref 7–20)
C-REACTIVE PROTEIN: 37.4 MG/L (ref 0–5.1)
CALCIUM SERPL-MCNC: 9.7 MG/DL (ref 8.3–10.6)
CHLORIDE BLD-SCNC: 103 MMOL/L (ref 99–110)
CHOLESTEROL, TOTAL: 192 MG/DL (ref 0–199)
CO2: 23 MMOL/L (ref 21–32)
CREAT SERPL-MCNC: 1 MG/DL (ref 0.8–1.3)
EOSINOPHILS ABSOLUTE: 0.2 K/UL (ref 0–0.6)
EOSINOPHILS RELATIVE PERCENT: 1.7 %
GFR AFRICAN AMERICAN: >60
GFR NON-AFRICAN AMERICAN: >60
GLOBULIN: 3.3 G/DL
GLUCOSE BLD-MCNC: 117 MG/DL (ref 70–99)
HCT VFR BLD CALC: 44.7 % (ref 40.5–52.5)
HDLC SERPL-MCNC: 53 MG/DL (ref 40–60)
HEMOGLOBIN: 14.9 G/DL (ref 13.5–17.5)
LDL CHOLESTEROL CALCULATED: 121 MG/DL
LYMPHOCYTES ABSOLUTE: 2.3 K/UL (ref 1–5.1)
LYMPHOCYTES RELATIVE PERCENT: 21.9 %
MCH RBC QN AUTO: 29.4 PG (ref 26–34)
MCHC RBC AUTO-ENTMCNC: 33.2 G/DL (ref 31–36)
MCV RBC AUTO: 88.5 FL (ref 80–100)
MONOCYTES ABSOLUTE: 1.2 K/UL (ref 0–1.3)
MONOCYTES RELATIVE PERCENT: 11.1 %
NEUTROPHILS ABSOLUTE: 6.8 K/UL (ref 1.7–7.7)
NEUTROPHILS RELATIVE PERCENT: 64.3 %
PDW BLD-RTO: 14.8 % (ref 12.4–15.4)
PLATELET # BLD: 490 K/UL (ref 135–450)
PMV BLD AUTO: 7.7 FL (ref 5–10.5)
POTASSIUM REFLEX MAGNESIUM: 4.2 MMOL/L (ref 3.5–5.1)
PROSTATE SPECIFIC ANTIGEN: 0.85 NG/ML (ref 0–4)
RBC # BLD: 5.05 M/UL (ref 4.2–5.9)
SODIUM BLD-SCNC: 143 MMOL/L (ref 136–145)
TOTAL PROTEIN: 7.5 G/DL (ref 6.4–8.2)
TRIGL SERPL-MCNC: 90 MG/DL (ref 0–150)
VLDLC SERPL CALC-MCNC: 18 MG/DL
WBC # BLD: 10.5 K/UL (ref 4–11)

## 2018-11-28 PROCEDURE — G0438 PPPS, INITIAL VISIT: HCPCS | Performed by: NURSE PRACTITIONER

## 2018-11-28 ASSESSMENT — LIFESTYLE VARIABLES
HOW OFTEN DURING THE LAST YEAR HAVE YOU HAD A FEELING OF GUILT OR REMORSE AFTER DRINKING: 0
HOW OFTEN DURING THE LAST YEAR HAVE YOU BEEN UNABLE TO REMEMBER WHAT HAPPENED THE NIGHT BEFORE BECAUSE YOU HAD BEEN DRINKING: 0
HOW MANY STANDARD DRINKS CONTAINING ALCOHOL DO YOU HAVE ON A TYPICAL DAY: 0
HAVE YOU OR SOMEONE ELSE BEEN INJURED AS A RESULT OF YOUR DRINKING: 0
HOW OFTEN DURING THE LAST YEAR HAVE YOU FOUND THAT YOU WERE NOT ABLE TO STOP DRINKING ONCE YOU HAD STARTED: 0
HOW OFTEN DURING THE LAST YEAR HAVE YOU FAILED TO DO WHAT WAS NORMALLY EXPECTED FROM YOU BECAUSE OF DRINKING: 0
HOW OFTEN DO YOU HAVE SIX OR MORE DRINKS ON ONE OCCASION: 0
AUDIT TOTAL SCORE: 1
HOW OFTEN DURING THE LAST YEAR HAVE YOU NEEDED AN ALCOHOLIC DRINK FIRST THING IN THE MORNING TO GET YOURSELF GOING AFTER A NIGHT OF HEAVY DRINKING: 0
AUDIT-C TOTAL SCORE: 1
HAS A RELATIVE, FRIEND, DOCTOR, OR ANOTHER HEALTH PROFESSIONAL EXPRESSED CONCERN ABOUT YOUR DRINKING OR SUGGESTED YOU CUT DOWN: 0
HOW OFTEN DO YOU HAVE A DRINK CONTAINING ALCOHOL: 1

## 2018-11-28 ASSESSMENT — PATIENT HEALTH QUESTIONNAIRE - PHQ9
SUM OF ALL RESPONSES TO PHQ QUESTIONS 1-9: 0
SUM OF ALL RESPONSES TO PHQ QUESTIONS 1-9: 0

## 2018-11-28 ASSESSMENT — ANXIETY QUESTIONNAIRES: GAD7 TOTAL SCORE: 0

## 2018-12-04 ENCOUNTER — HOSPITAL ENCOUNTER (EMERGENCY)
Age: 73
Discharge: HOME | End: 2018-12-04
Attending: EMERGENCY MEDICINE
Payer: COMMERCIAL

## 2018-12-04 VITALS
WEIGHT: 224 LBS | SYSTOLIC BLOOD PRESSURE: 142 MMHG | HEIGHT: 72 IN | TEMPERATURE: 98.3 F | DIASTOLIC BLOOD PRESSURE: 89 MMHG | RESPIRATION RATE: 10 BRPM | OXYGEN SATURATION: 94 % | HEART RATE: 84 BPM | BODY MASS INDEX: 30.34 KG/M2

## 2018-12-04 DIAGNOSIS — K62.5 RECTAL BLEEDING: Primary | ICD-10-CM

## 2018-12-04 LAB
A/G RATIO: 1.1 (ref 1.1–2.2)
ALBUMIN SERPL-MCNC: 4 G/DL (ref 3.4–5)
ALP BLD-CCNC: 77 U/L (ref 40–129)
ALT SERPL-CCNC: 19 U/L (ref 10–40)
ANION GAP SERPL CALCULATED.3IONS-SCNC: 14 MMOL/L (ref 3–16)
APTT: 31.1 SEC (ref 26–36)
AST SERPL-CCNC: 18 U/L (ref 15–37)
BASOPHILS ABSOLUTE: 0.1 K/UL (ref 0–0.2)
BASOPHILS RELATIVE PERCENT: 1.1 %
BILIRUB SERPL-MCNC: 0.4 MG/DL (ref 0–1)
BUN BLDV-MCNC: 17 MG/DL (ref 7–20)
CALCIUM SERPL-MCNC: 9.3 MG/DL (ref 8.3–10.6)
CHLORIDE BLD-SCNC: 99 MMOL/L (ref 99–110)
CO2: 25 MMOL/L (ref 21–32)
CREAT SERPL-MCNC: 1 MG/DL (ref 0.8–1.3)
EOSINOPHILS ABSOLUTE: 0.3 K/UL (ref 0–0.6)
EOSINOPHILS RELATIVE PERCENT: 2.6 %
GFR AFRICAN AMERICAN: >60
GFR NON-AFRICAN AMERICAN: >60
GLOBULIN: 3.7 G/DL
GLUCOSE BLD-MCNC: 121 MG/DL (ref 70–99)
HCT VFR BLD CALC: 41.6 % (ref 40.5–52.5)
HEMOGLOBIN: 14 G/DL (ref 13.5–17.5)
INR BLD: 1.06 (ref 0.86–1.14)
LYMPHOCYTES ABSOLUTE: 2.4 K/UL (ref 1–5.1)
LYMPHOCYTES RELATIVE PERCENT: 20.9 %
MCH RBC QN AUTO: 29.4 PG (ref 26–34)
MCHC RBC AUTO-ENTMCNC: 33.7 G/DL (ref 31–36)
MCV RBC AUTO: 87.1 FL (ref 80–100)
MONOCYTES ABSOLUTE: 1.4 K/UL (ref 0–1.3)
MONOCYTES RELATIVE PERCENT: 11.8 %
NEUTROPHILS ABSOLUTE: 7.4 K/UL (ref 1.7–7.7)
NEUTROPHILS RELATIVE PERCENT: 63.6 %
PDW BLD-RTO: 14.6 % (ref 12.4–15.4)
PLATELET # BLD: 434 K/UL (ref 135–450)
PMV BLD AUTO: 7.4 FL (ref 5–10.5)
POTASSIUM SERPL-SCNC: 3.8 MMOL/L (ref 3.5–5.1)
PROTHROMBIN TIME: 12.1 SEC (ref 9.8–13)
RBC # BLD: 4.78 M/UL (ref 4.2–5.9)
SODIUM BLD-SCNC: 138 MMOL/L (ref 136–145)
TOTAL PROTEIN: 7.7 G/DL (ref 6.4–8.2)
WBC # BLD: 11.7 K/UL (ref 4–11)

## 2018-12-04 PROCEDURE — 93010 ELECTROCARDIOGRAM REPORT: CPT | Performed by: INTERNAL MEDICINE

## 2018-12-04 PROCEDURE — 85610 PROTHROMBIN TIME: CPT

## 2018-12-04 PROCEDURE — 99283 EMERGENCY DEPT VISIT LOW MDM: CPT

## 2018-12-04 PROCEDURE — 85025 COMPLETE CBC W/AUTO DIFF WBC: CPT

## 2018-12-04 PROCEDURE — 80053 COMPREHEN METABOLIC PANEL: CPT

## 2018-12-04 PROCEDURE — 93005 ELECTROCARDIOGRAM TRACING: CPT | Performed by: EMERGENCY MEDICINE

## 2018-12-04 PROCEDURE — 85730 THROMBOPLASTIN TIME PARTIAL: CPT

## 2018-12-04 ASSESSMENT — ENCOUNTER SYMPTOMS
BACK PAIN: 0
EYE PAIN: 0
ABDOMINAL DISTENTION: 0
COUGH: 0
BLOOD IN STOOL: 1
SORE THROAT: 0
SHORTNESS OF BREATH: 0
EYE REDNESS: 0
CONSTIPATION: 0

## 2018-12-04 NOTE — ED PROVIDER NOTES
Emergency [address] [facility] ED    Patient: Wendy Wilkerson  MRN: [id number]  : 1945  Date of Evaluation: 2018  ED Provider: Gemma Santoyo DO    Chief Complaint       Chief Complaint   Patient presents with    Rectal Bleeding     pt c/o bright red blood in his stool this morning. Pt denies abdominal pain      HPI     History provided by patient  Mode of arrival: private car  History limited by no limitations    Wendy Wilkerson is a 72 y.o. male who presents to the emergency department With complaints of rectal bleeding. States he had bright red blood in his stool this morning. Denies any pain. Has never had anything like this previously. Has been on diclofenac since October. Denies any history of ulcers. ROS:     Review of Systems   Constitutional: Negative for chills and fever. HENT: Negative for congestion and sore throat. Eyes: Negative for pain and redness. Respiratory: Negative for cough and shortness of breath. Cardiovascular: Negative for chest pain and palpitations. Gastrointestinal: Positive for blood in stool. Negative for abdominal distention and constipation. Genitourinary: Negative for dysuria and frequency. Musculoskeletal: Negative for back pain and gait problem. Neurological: Negative for dizziness and headaches. Psychiatric/Behavioral: Negative for agitation and confusion. All other systems reviewed and are negative.       Past History     Past Medical History:   Diagnosis Date    Arthritis     Lema esophagus     Hyperlipidemia     Hypertension     Leg cramps     Polyp of colon     Ringing in ears      Past Surgical History:   Procedure Laterality Date    COLONOSCOPY  2010    polyps    COLONOSCOPY  2016    Diverticulosis    TOE SURGERY Bilateral 2011    toe nails Big toes removed    UPPER GASTROINTESTINAL ENDOSCOPY  12    lema's esophagus    UPPER GASTROINTESTINAL ENDOSCOPY  2018    GERD-3 year follow up   [address]  2014    lema's esophagus     Social History     Social History    Marital status:      Spouse name: N/A    Number of children: 0    Years of education: N/A     Occupational History          [employer]    [employer]      Social History Main Topics    Smoking status: Never Smoker    Smokeless tobacco: Never Used    Alcohol use 4.2 oz/week     7 Shots of liquor per week      Comment: occ at night    Drug use: No    Sexual activity: Yes     Partners: Female     Other Topics Concern    None     Social History Narrative    None     Family History   Problem Relation Age of Onset    Heart Disease Father     Heart Attack Father     High Cholesterol Father     Mult Sclerosis Sister     Other Brother         South[place] Fever       Medications/Allergies     Previous Medications    ASPIRIN EC 81 MG EC TABLET    Take 81 mg by mouth daily. CYCLOBENZAPRINE (FLEXERIL) 10 MG TABLET    TAKE 1 TABLET BY MOUTH 3 TIMES DAILY AS NEEDED    DICLOFENAC (VOLTAREN) 75 MG EC TABLET    Take 1 tablet by mouth 2 times daily    ESOMEPRAZOLE (NEXIUM) 40 MG CAPSULE    Take 40 mg by mouth every morning (before breakfast). FEXOFENADINE (ALLEGRA ALLERGY) 180 MG TABLET    Take 180 mg by mouth daily. LOSARTAN-HYDROCHLOROTHIAZIDE (HYZAAR) 50-12.5 MG PER TABLET    TAKE 1 TABLET BY MOUTH DAILY    MULTIPLE VITAMINS-MINERALS (VISION VITAMINS PO)    Take 1 capsule by mouth daily. NIFEDIPINE (PROCARDIA XL) 90 MG EXTENDED RELEASE TABLET    TAKE 1 TABLET BY MOUTH DAILY    POTASSIUM GLUCONATE    Take 1 tablet by mouth daily. PROBIOTIC PRODUCT (SOLUBLE FIBER/PROBIOTICS PO)    Take 1 capsule by mouth daily.      Allergies   Allergen Reactions    Carafate [Sucralfate] Nausea Only    Cat Hair Extract Other (See Comments)     sneezing    Pravachol [Pravastatin] Other (See Comments)     Myalgia      Vytorin [Ezetimibe-Simvastatin] Other (See Comments) Muscle cramps          Physical Exam       ED Triage Vitals [12/04/18 0751]   BP Temp Temp Source Pulse Resp SpO2 Height Weight   (!) 153/92 98.3 °F (36.8 °C) Oral 99 16 98 % 6' (1.829 m) 224 lb (101.6 kg)       Physical Exam   Constitutional: He is oriented to person, place, and time. He appears well-developed and well-nourished. No distress. HENT:   Head: Normocephalic and atraumatic. Eyes: Pupils are equal, round, and reactive to light. Conjunctivae and EOM are normal.   Neck: Normal range of motion. Neck supple. Cardiovascular: Normal rate, regular rhythm and normal heart sounds. Pulmonary/Chest: Effort normal and breath sounds normal. No respiratory distress. He has no wheezes. He has no rales. Abdominal: Soft. Bowel sounds are normal. He exhibits no distension. There is no tenderness. There is no rebound. Genitourinary:   Genitourinary Comments: Bright red blood on rectal exam   Musculoskeletal: Normal range of motion. He exhibits no edema, tenderness or deformity. Neurological: He is alert and oriented to person, place, and time. Skin: Skin is warm and dry. No rash noted. He is not diaphoretic. No erythema. No pallor. Psychiatric: He has a normal mood and affect. His behavior is normal. Judgment and thought content normal.   Nursing note and vitals reviewed.       Diagnostics   Labs:  Results for orders placed or performed during the hospital encounter of 12/04/18   CBC auto differential   Result Value Ref Range    WBC 11.7 (H) 4.0 - 11.0 K/uL    RBC 4.78 4.20 - 5.90 M/uL    Hemoglobin 14.0 13.5 - 17.5 g/dL    Hematocrit 41.6 40.5 - 52.5 %    MCV 87.1 80.0 - 100.0 fL    MCH 29.4 26.0 - 34.0 pg    MCHC 33.7 31.0 - 36.0 g/dL    RDW 14.6 12.4 - 15.4 %    Platelets 658 938 - 868 K/uL    MPV 7.4 5.0 - 10.5 fL    Neutrophils % 63.6 %    Lymphocytes % 20.9 %    Monocytes % 11.8 %    Eosinophils % 2.6 %    Basophils % 1.1 %    Neutrophils # 7.4 1.7 - 7.7 K/uL    Lymphocytes # 2.4 1.0 - 5.1 K/uL

## 2018-12-04 NOTE — ED NOTES
9436 - called GI a/s for consult (Dr. Jay Mtz is on call)     Adolfo Betancourt  12/04/18 1296    9706 - Dr. Jay Mtz called back     Adolfo Betancourt  12/04/18 4907

## 2018-12-05 ENCOUNTER — HOSPITAL ENCOUNTER (EMERGENCY)
Age: 73
Discharge: HOME | End: 2018-12-05
Attending: EMERGENCY MEDICINE
Payer: COMMERCIAL

## 2018-12-05 ENCOUNTER — APPOINTMENT (OUTPATIENT)
Dept: CT IMAGING | Age: 73
End: 2018-12-05
Payer: COMMERCIAL

## 2018-12-05 ENCOUNTER — HOSPITAL ENCOUNTER (OUTPATIENT)
Dept: VASCULAR LAB | Age: 73
Discharge: HOME | End: 2018-12-05
Payer: COMMERCIAL

## 2018-12-05 ENCOUNTER — HOSPITAL ENCOUNTER (OUTPATIENT)
Dept: GENERAL RADIOLOGY | Age: 73
Discharge: HOME | End: 2018-12-05
Payer: COMMERCIAL

## 2018-12-05 VITALS
SYSTOLIC BLOOD PRESSURE: 137 MMHG | RESPIRATION RATE: 18 BRPM | BODY MASS INDEX: 30.34 KG/M2 | HEART RATE: 82 BPM | DIASTOLIC BLOOD PRESSURE: 82 MMHG | OXYGEN SATURATION: 97 % | HEIGHT: 72 IN | WEIGHT: 224 LBS | TEMPERATURE: 98.3 F

## 2018-12-05 DIAGNOSIS — M94.9 DISORDER OF CARTILAGE: ICD-10-CM

## 2018-12-05 DIAGNOSIS — K62.5 RECTAL BLEEDING: Primary | ICD-10-CM

## 2018-12-05 DIAGNOSIS — M25.561 CHRONIC PAIN OF RIGHT KNEE: ICD-10-CM

## 2018-12-05 DIAGNOSIS — G89.29 CHRONIC PAIN OF RIGHT KNEE: ICD-10-CM

## 2018-12-05 DIAGNOSIS — I71.9 AORTIC ANEURYSM WITHOUT RUPTURE, UNSPECIFIED PORTION OF AORTA (HCC): ICD-10-CM

## 2018-12-05 LAB
A/G RATIO: 1.1 (ref 1.1–2.2)
ALBUMIN SERPL-MCNC: 3.8 G/DL (ref 3.4–5)
ALP BLD-CCNC: 72 U/L (ref 40–129)
ALT SERPL-CCNC: 19 U/L (ref 10–40)
ANION GAP SERPL CALCULATED.3IONS-SCNC: 13 MMOL/L (ref 3–16)
AST SERPL-CCNC: 19 U/L (ref 15–37)
BASOPHILS ABSOLUTE: 0.1 K/UL (ref 0–0.2)
BASOPHILS RELATIVE PERCENT: 0.8 %
BILIRUB SERPL-MCNC: 0.5 MG/DL (ref 0–1)
BUN BLDV-MCNC: 20 MG/DL (ref 7–20)
CALCIUM SERPL-MCNC: 8.9 MG/DL (ref 8.3–10.6)
CHLORIDE BLD-SCNC: 99 MMOL/L (ref 99–110)
CO2: 24 MMOL/L (ref 21–32)
CREAT SERPL-MCNC: 1 MG/DL (ref 0.8–1.3)
EKG ATRIAL RATE: 396 BPM
EKG DIAGNOSIS: NORMAL
EKG P AXIS: 47 DEGREES
EKG Q-T INTERVAL: 338 MS
EKG QRS DURATION: 74 MS
EKG QTC CALCULATION (BAZETT): 433 MS
EKG R AXIS: 29 DEGREES
EKG T AXIS: 68 DEGREES
EKG VENTRICULAR RATE: 99 BPM
EOSINOPHILS ABSOLUTE: 0.2 K/UL (ref 0–0.6)
EOSINOPHILS RELATIVE PERCENT: 2.1 %
GFR AFRICAN AMERICAN: >60
GFR NON-AFRICAN AMERICAN: >60
GLOBULIN: 3.4 G/DL
GLUCOSE BLD-MCNC: 143 MG/DL (ref 70–99)
HCT VFR BLD CALC: 38.4 % (ref 40.5–52.5)
HEMOGLOBIN: 13 G/DL (ref 13.5–17.5)
INR BLD: 1.14 (ref 0.86–1.14)
LYMPHOCYTES ABSOLUTE: 2 K/UL (ref 1–5.1)
LYMPHOCYTES RELATIVE PERCENT: 17.8 %
MCH RBC QN AUTO: 29.5 PG (ref 26–34)
MCHC RBC AUTO-ENTMCNC: 33.9 G/DL (ref 31–36)
MCV RBC AUTO: 87 FL (ref 80–100)
MONOCYTES ABSOLUTE: 1.2 K/UL (ref 0–1.3)
MONOCYTES RELATIVE PERCENT: 10.5 %
NEUTROPHILS ABSOLUTE: 7.8 K/UL (ref 1.7–7.7)
NEUTROPHILS RELATIVE PERCENT: 68.8 %
PDW BLD-RTO: 14.6 % (ref 12.4–15.4)
PLATELET # BLD: 407 K/UL (ref 135–450)
PMV BLD AUTO: 7.4 FL (ref 5–10.5)
POTASSIUM SERPL-SCNC: 3.7 MMOL/L (ref 3.5–5.1)
PROTHROMBIN TIME: 13 SEC (ref 9.8–13)
RBC # BLD: 4.41 M/UL (ref 4.2–5.9)
SODIUM BLD-SCNC: 136 MMOL/L (ref 136–145)
TOTAL PROTEIN: 7.2 G/DL (ref 6.4–8.2)
WBC # BLD: 11.3 K/UL (ref 4–11)

## 2018-12-05 PROCEDURE — 85025 COMPLETE CBC W/AUTO DIFF WBC: CPT

## 2018-12-05 PROCEDURE — 99284 EMERGENCY DEPT VISIT MOD MDM: CPT

## 2018-12-05 PROCEDURE — 74177 CT ABD & PELVIS W/CONTRAST: CPT

## 2018-12-05 PROCEDURE — 80053 COMPREHEN METABOLIC PANEL: CPT

## 2018-12-05 PROCEDURE — 85610 PROTHROMBIN TIME: CPT

## 2018-12-05 PROCEDURE — 6360000004 HC RX CONTRAST MEDICATION: Performed by: EMERGENCY MEDICINE

## 2018-12-05 PROCEDURE — 2580000003 HC RX 258: Performed by: EMERGENCY MEDICINE

## 2018-12-05 PROCEDURE — 93880 EXTRACRANIAL BILAT STUDY: CPT

## 2018-12-05 PROCEDURE — 96360 HYDRATION IV INFUSION INIT: CPT

## 2018-12-05 RX ORDER — TRAMADOL HYDROCHLORIDE 50 MG/1
50 TABLET ORAL EVERY 6 HOURS PRN
Qty: 12 TABLET | Refills: 0 | Status: SHIPPED | OUTPATIENT
Start: 2018-12-05 | End: 2018-12-08

## 2018-12-05 RX ORDER — 0.9 % SODIUM CHLORIDE 0.9 %
1000 INTRAVENOUS SOLUTION INTRAVENOUS ONCE
Status: COMPLETED | OUTPATIENT
Start: 2018-12-05 | End: 2018-12-05

## 2018-12-05 RX ADMIN — SODIUM CHLORIDE 1000 ML: 9 INJECTION, SOLUTION INTRAVENOUS at 06:24

## 2018-12-05 RX ADMIN — IOPAMIDOL 75 ML: 755 INJECTION, SOLUTION INTRAVENOUS at 05:54

## 2018-12-05 NOTE — ED TRIAGE NOTES
Chief Complaint   Patient presents with    Rectal Bleeding     pt arrives to room 10 c/o rectal bleeding onset x 1 day, ems states pt seem for same yesterday and has another bm this am with blood in it, ems states pt passing bright red blood, ems states pt vitals stable

## 2018-12-05 NOTE — ED PROVIDER NOTES
Emergency Physician Note    Chief Complaint  Rectal Bleeding (pt arrives to room 10 c/o rectal bleeding onset x 1 day, ems states pt seem for same yesterday and has another bm this am with blood in it, ems states pt passing bright red blood, ems states pt vitals stable)       History of Present Illness  Lida Matos is a 72 y.o. male who presents to the ED for rectal bleeding. Patient reports that he has had bright red blood with his stools since yesterday morning. Patient reports that he had an episode in the morning which prompted him to report to the emergency department. At that time, blood work was obtained and showing no concerning acute abnormalities. H&H 14 and 41. Otherwise, no other concerning acute abnormalities. Patient had bright red blood on rectal exam yesterday. At that time, patient was hemodynamically stable. GI team was consulted and plan was to manage patient in the outpatient setting with close follow-up appointment in the next several days. After discharge, patient reported that he had several other episodes of bright red blood during the evening. The persistent bleeding is a proctoscopy patient to return to the emergency department. No reported chest pain, shortness of breath, abdominal pain, nausea, vomiting, diarrhea, constipation, melena, hematochezia, dysuria, hematuria. 10 systems reviewed, pertinent positives per HPI otherwise noted to be negative    I have reviewed the following from the nursing documentation:      Prior to Admission medications    Medication Sig Start Date End Date Taking?  Authorizing Provider   losartan-hydrochlorothiazide (HYZAAR) 50-12.5 MG per tablet TAKE 1 TABLET BY MOUTH DAILY 11/5/18  Yes JAY JAY Payne - CNP   NIFEdipine (PROCARDIA XL) 90 MG extended release tablet TAKE 1 TABLET BY MOUTH DAILY 11/2/18  Yes JAY JYA Payne - CNP   diclofenac (VOLTAREN) 75 MG EC tablet Take 1 tablet by mouth 2 times daily 10/17/18  Yes Additional Contrast? None   Final Result   1. Diverticulosis without scan evidence for diverticulitis. 2. Aneurysmal dilation of the ascending aorta at 4.2 cm. LABS  Results for orders placed or performed during the hospital encounter of 12/05/18   CBC auto differential   Result Value Ref Range    WBC 11.3 (H) 4.0 - 11.0 K/uL    RBC 4.41 4.20 - 5.90 M/uL    Hemoglobin 13.0 (L) 13.5 - 17.5 g/dL    Hematocrit 38.4 (L) 40.5 - 52.5 %    MCV 87.0 80.0 - 100.0 fL    MCH 29.5 26.0 - 34.0 pg    MCHC 33.9 31.0 - 36.0 g/dL    RDW 14.6 12.4 - 15.4 %    Platelets 830 975 - 654 K/uL    MPV 7.4 5.0 - 10.5 fL    Neutrophils % 68.8 %    Lymphocytes % 17.8 %    Monocytes % 10.5 %    Eosinophils % 2.1 %    Basophils % 0.8 %    Neutrophils # 7.8 (H) 1.7 - 7.7 K/uL    Lymphocytes # 2.0 1.0 - 5.1 K/uL    Monocytes # 1.2 0.0 - 1.3 K/uL    Eosinophils # 0.2 0.0 - 0.6 K/uL    Basophils # 0.1 0.0 - 0.2 K/uL   Comprehensive metabolic panel   Result Value Ref Range    Sodium 136 136 - 145 mmol/L    Potassium 3.7 3.5 - 5.1 mmol/L    Chloride 99 99 - 110 mmol/L    CO2 24 21 - 32 mmol/L    Anion Gap 13 3 - 16    Glucose 143 (H) 70 - 99 mg/dL    BUN 20 7 - 20 mg/dL    CREATININE 1.0 0.8 - 1.3 mg/dL    GFR Non-African American >60 >60    GFR African American >60 >60    Calcium 8.9 8.3 - 10.6 mg/dL    Total Protein 7.2 6.4 - 8.2 g/dL    Alb 3.8 3.4 - 5.0 g/dL    Albumin/Globulin Ratio 1.1 1.1 - 2.2    Total Bilirubin 0.5 0.0 - 1.0 mg/dL    Alkaline Phosphatase 72 40 - 129 U/L    ALT 19 10 - 40 U/L    AST 19 15 - 37 U/L    Globulin 3.4 g/dL   Protime-INR   Result Value Ref Range    Protime 13.0 9.8 - 13.0 sec    INR 1.14 0.86 - 1.14         PROCEDURES      MEDICAL DECISION MAKING  On arrival to the emergency department, patient afebrile with otherwise normal vital signs. Patient mother tachycardic at 102 after walking to the room however shortly afterwards, heart rate now into the 80s without any intervention.   CBC, CMP obtained

## 2018-12-06 ENCOUNTER — TELEPHONE (OUTPATIENT)
Dept: ORTHOPEDIC SURGERY | Age: 73
End: 2018-12-06

## 2018-12-06 ENCOUNTER — OFFICE VISIT (OUTPATIENT)
Dept: ORTHOPEDIC SURGERY | Age: 73
End: 2018-12-06
Payer: COMMERCIAL

## 2018-12-06 VITALS — WEIGHT: 223.99 LBS | HEIGHT: 72 IN | BODY MASS INDEX: 30.34 KG/M2

## 2018-12-06 DIAGNOSIS — M25.561 RIGHT KNEE PAIN, UNSPECIFIED CHRONICITY: Primary | ICD-10-CM

## 2018-12-06 DIAGNOSIS — M17.11 PRIMARY OSTEOARTHRITIS OF RIGHT KNEE: ICD-10-CM

## 2018-12-06 LAB
APPEARANCE FLUID: NORMAL
CELL COUNT FLUID TYPE: NORMAL
CLOT EVALUATION: NORMAL
COLOR FLUID: YELLOW
CRYSTALS, FLUID: NORMAL
LYMPHOCYTES, BODY FLUID: 11 %
MACROPHAGE FLUID: 16 %
NEUTROPHIL, FLUID: 73 %
NUCLEATED CELLS FLUID: NORMAL /CUMM
NUMBER OF CELLS COUNTED FLUID: 100
RBC FLUID: 757 /CUMM
SOURCE BODY FLUID: NORMAL

## 2018-12-06 PROCEDURE — 99203 OFFICE O/P NEW LOW 30 MIN: CPT | Performed by: ORTHOPAEDIC SURGERY

## 2018-12-06 PROCEDURE — 20610 DRAIN/INJ JOINT/BURSA W/O US: CPT | Performed by: ORTHOPAEDIC SURGERY

## 2018-12-06 NOTE — PROGRESS NOTES
I am evaluating this patient as a consult at the request of JAY JAY MARSHALL CNP      Chief Complaint:  Knee Pain (Right knee pain - prev inj )      History of Present Illness:  Mercedes Little is a 72 y.o. male here regarding right knee discomfort, he began noticed pain and swelling to the right knee despite an injury about 6 weeks ago, has also had overall aches and pains throughout the body was therefore evaluated by his primary care physician who ordered inflammation markers. CRP returned at 33 most recently at 37 the patient denies fevers or chills, denies pain with range of motion of the knee. Does have pain with weightbearing and has begun using a cane this past week. At baseline he is quite active, has always played sports  when young, has had injuries to the right knee over the years. Has known osteoarthritis since the 90s. Has not had any injections or anti-inflammatories. He was provided prednisone and diclofenac from his primary care physician noticed symptoms a few weeks ago, unfortunately had to stop this hasn't caused rectal bleeding after only a few days. He was feeling better on the steroids. Patient was ultimately referred to me for evaluation. He works with a  2-3 days a week, they modify all of his activities given the arthritis in his knee even before this recent flareup. Patient states prior to this most recent flareup he has noticed stiffness after sitting for long periods of time especially in the morning. His knees loosen up a lot better after he has been exercising.     Pain Assessment:  Pain Assessment  Location of Pain: Knee  Location Modifiers: Right  Severity of Pain: 4  Duration of Pain: A few hours  Frequency of Pain: Constant  Aggravating Factors: Stairs, Walking, Other (Comment) (sitting too long)  Limiting Behavior: Some  Relieving Factors: Rest  Result of Injury: No  Work-Related Injury: No  Are there other pain locations you wish

## 2018-12-10 ENCOUNTER — OFFICE VISIT (OUTPATIENT)
Dept: FAMILY MEDICINE CLINIC | Age: 73
End: 2018-12-10
Payer: COMMERCIAL

## 2018-12-10 VITALS
DIASTOLIC BLOOD PRESSURE: 78 MMHG | BODY MASS INDEX: 29.39 KG/M2 | HEART RATE: 102 BPM | OXYGEN SATURATION: 98 % | WEIGHT: 217 LBS | SYSTOLIC BLOOD PRESSURE: 136 MMHG | HEIGHT: 72 IN | RESPIRATION RATE: 19 BRPM

## 2018-12-10 DIAGNOSIS — K92.2 GASTROINTESTINAL HEMORRHAGE, UNSPECIFIED GASTROINTESTINAL HEMORRHAGE TYPE: ICD-10-CM

## 2018-12-10 DIAGNOSIS — G89.29 CHRONIC PAIN OF RIGHT KNEE: ICD-10-CM

## 2018-12-10 DIAGNOSIS — M25.561 CHRONIC PAIN OF RIGHT KNEE: ICD-10-CM

## 2018-12-10 DIAGNOSIS — K92.2 GASTROINTESTINAL HEMORRHAGE, UNSPECIFIED GASTROINTESTINAL HEMORRHAGE TYPE: Primary | ICD-10-CM

## 2018-12-10 LAB
HCT VFR BLD CALC: 34.6 % (ref 40.5–52.5)
HEMOGLOBIN: 11.3 G/DL (ref 13.5–17.5)

## 2018-12-10 PROCEDURE — 99213 OFFICE O/P EST LOW 20 MIN: CPT | Performed by: NURSE PRACTITIONER

## 2018-12-10 ASSESSMENT — ENCOUNTER SYMPTOMS
CHEST TIGHTNESS: 0
VOMITING: 0
ABDOMINAL PAIN: 0
BLOOD IN STOOL: 1
NAUSEA: 0
ANAL BLEEDING: 1
DIARRHEA: 0
CONSTIPATION: 0
COUGH: 0
BACK PAIN: 0

## 2018-12-14 ENCOUNTER — TELEPHONE (OUTPATIENT)
Dept: FAMILY MEDICINE CLINIC | Age: 73
End: 2018-12-14

## 2018-12-14 ENCOUNTER — HOSPITAL ENCOUNTER (OUTPATIENT)
Dept: GENERAL RADIOLOGY | Age: 73
Discharge: HOME | End: 2018-12-14
Payer: COMMERCIAL

## 2018-12-14 DIAGNOSIS — M94.9 DISORDER OF CARTILAGE: ICD-10-CM

## 2018-12-14 PROCEDURE — 77080 DXA BONE DENSITY AXIAL: CPT

## 2018-12-14 NOTE — TELEPHONE ENCOUNTER
Dexa axial skeleton with vertebral fracture assessment, hospital only does vertebral fracture assessment if there is a history of compression fracture. They are going to do a regular Dexa scan, they can take the verbal for now, but will need a new order faxed to them at [phone].

## 2018-12-24 LAB
CULTURE, JOINT AEROBIC: NORMAL
CULTURE, JOINT ANAEROBIC: NORMAL

## 2019-01-02 ENCOUNTER — HOSPITAL ENCOUNTER (OUTPATIENT)
Age: 74
Discharge: HOME | End: 2019-01-02
Payer: COMMERCIAL

## 2019-01-02 LAB
BASOPHILS ABSOLUTE: 0.1 K/UL (ref 0–0.2)
BASOPHILS RELATIVE PERCENT: 1.1 %
EOSINOPHILS ABSOLUTE: 0.3 K/UL (ref 0–0.6)
EOSINOPHILS RELATIVE PERCENT: 3.3 %
HCT VFR BLD CALC: 34 % (ref 40.5–52.5)
HEMOGLOBIN: 11.1 G/DL (ref 13.5–17.5)
LYMPHOCYTES ABSOLUTE: 2.5 K/UL (ref 1–5.1)
LYMPHOCYTES RELATIVE PERCENT: 26.9 %
MCH RBC QN AUTO: 28 PG (ref 26–34)
MCHC RBC AUTO-ENTMCNC: 32.6 G/DL (ref 31–36)
MCV RBC AUTO: 85.9 FL (ref 80–100)
MONOCYTES ABSOLUTE: 1.3 K/UL (ref 0–1.3)
MONOCYTES RELATIVE PERCENT: 13.8 %
NEUTROPHILS ABSOLUTE: 5.2 K/UL (ref 1.7–7.7)
NEUTROPHILS RELATIVE PERCENT: 54.9 %
PDW BLD-RTO: 14.6 % (ref 12.4–15.4)
PLATELET # BLD: 486 K/UL (ref 135–450)
PMV BLD AUTO: 7.4 FL (ref 5–10.5)
RBC # BLD: 3.96 M/UL (ref 4.2–5.9)
WBC # BLD: 9.4 K/UL (ref 4–11)

## 2019-01-02 PROCEDURE — 36415 COLL VENOUS BLD VENIPUNCTURE: CPT

## 2019-01-02 PROCEDURE — 85025 COMPLETE CBC W/AUTO DIFF WBC: CPT

## 2019-01-03 ENCOUNTER — OFFICE VISIT (OUTPATIENT)
Dept: ORTHOPEDIC SURGERY | Age: 74
End: 2019-01-03
Payer: COMMERCIAL

## 2019-01-03 VITALS — BODY MASS INDEX: 30.2 KG/M2 | HEIGHT: 72 IN | WEIGHT: 223 LBS

## 2019-01-03 DIAGNOSIS — M17.11 PRIMARY OSTEOARTHRITIS OF RIGHT KNEE: Primary | ICD-10-CM

## 2019-01-03 PROCEDURE — 99213 OFFICE O/P EST LOW 20 MIN: CPT | Performed by: ORTHOPAEDIC SURGERY

## 2019-01-03 PROCEDURE — 20610 DRAIN/INJ JOINT/BURSA W/O US: CPT | Performed by: ORTHOPAEDIC SURGERY

## 2019-01-10 ENCOUNTER — OFFICE VISIT (OUTPATIENT)
Dept: ORTHOPEDIC SURGERY | Age: 74
End: 2019-01-10
Payer: COMMERCIAL

## 2019-01-10 VITALS — BODY MASS INDEX: 29.12 KG/M2 | WEIGHT: 215 LBS | HEIGHT: 72 IN

## 2019-01-10 DIAGNOSIS — M17.11 PRIMARY OSTEOARTHRITIS OF RIGHT KNEE: Primary | ICD-10-CM

## 2019-01-10 PROCEDURE — 20610 DRAIN/INJ JOINT/BURSA W/O US: CPT | Performed by: ORTHOPAEDIC SURGERY

## 2019-01-17 ENCOUNTER — OFFICE VISIT (OUTPATIENT)
Dept: ORTHOPEDIC SURGERY | Age: 74
End: 2019-01-17
Payer: COMMERCIAL

## 2019-01-17 VITALS — BODY MASS INDEX: 29.11 KG/M2 | WEIGHT: 214.95 LBS | HEIGHT: 72 IN

## 2019-01-17 DIAGNOSIS — M17.11 PRIMARY OSTEOARTHRITIS OF RIGHT KNEE: Primary | ICD-10-CM

## 2019-01-17 PROCEDURE — 20610 DRAIN/INJ JOINT/BURSA W/O US: CPT | Performed by: ORTHOPAEDIC SURGERY

## 2019-02-06 DIAGNOSIS — M25.461 EFFUSION OF RIGHT KNEE: ICD-10-CM

## 2019-02-06 DIAGNOSIS — M79.10 MYALGIA: ICD-10-CM

## 2019-02-06 RX ORDER — DICLOFENAC SODIUM 75 MG/1
75 TABLET, DELAYED RELEASE ORAL 2 TIMES DAILY
Qty: 60 TABLET | Refills: 1 | Status: SHIPPED | OUTPATIENT
Start: 2019-02-06 | End: 2019-02-18

## 2019-02-11 ENCOUNTER — HOSPITAL ENCOUNTER (OUTPATIENT)
Age: 74
Discharge: HOME | End: 2019-02-11
Payer: COMMERCIAL

## 2019-02-11 LAB
BASOPHILS ABSOLUTE: 0.1 K/UL (ref 0–0.2)
BASOPHILS RELATIVE PERCENT: 0.8 %
EOSINOPHILS ABSOLUTE: 0.2 K/UL (ref 0–0.6)
EOSINOPHILS RELATIVE PERCENT: 2.6 %
HCT VFR BLD CALC: 45.7 % (ref 40.5–52.5)
HEMOGLOBIN: 15 G/DL (ref 13.5–17.5)
LYMPHOCYTES ABSOLUTE: 1.9 K/UL (ref 1–5.1)
LYMPHOCYTES RELATIVE PERCENT: 21.1 %
MCH RBC QN AUTO: 28.6 PG (ref 26–34)
MCHC RBC AUTO-ENTMCNC: 32.9 G/DL (ref 31–36)
MCV RBC AUTO: 86.9 FL (ref 80–100)
MONOCYTES ABSOLUTE: 1.1 K/UL (ref 0–1.3)
MONOCYTES RELATIVE PERCENT: 11.5 %
NEUTROPHILS ABSOLUTE: 5.8 K/UL (ref 1.7–7.7)
NEUTROPHILS RELATIVE PERCENT: 64 %
PDW BLD-RTO: 16.7 % (ref 12.4–15.4)
PLATELET # BLD: 377 K/UL (ref 135–450)
PMV BLD AUTO: 7.9 FL (ref 5–10.5)
RBC # BLD: 5.26 M/UL (ref 4.2–5.9)
WBC # BLD: 9.1 K/UL (ref 4–11)

## 2019-02-11 PROCEDURE — 85025 COMPLETE CBC W/AUTO DIFF WBC: CPT

## 2019-02-11 PROCEDURE — 36415 COLL VENOUS BLD VENIPUNCTURE: CPT

## 2019-02-18 ENCOUNTER — TELEPHONE (OUTPATIENT)
Dept: FAMILY MEDICINE CLINIC | Age: 74
End: 2019-02-18

## 2019-02-18 ENCOUNTER — OFFICE VISIT (OUTPATIENT)
Dept: FAMILY MEDICINE CLINIC | Age: 74
End: 2019-02-18
Payer: COMMERCIAL

## 2019-02-18 VITALS
BODY MASS INDEX: 30.26 KG/M2 | OXYGEN SATURATION: 98 % | DIASTOLIC BLOOD PRESSURE: 84 MMHG | WEIGHT: 223.4 LBS | HEART RATE: 97 BPM | SYSTOLIC BLOOD PRESSURE: 124 MMHG | RESPIRATION RATE: 19 BRPM | HEIGHT: 72 IN

## 2019-02-18 DIAGNOSIS — R73.01 IMPAIRED FASTING GLUCOSE: Primary | ICD-10-CM

## 2019-02-18 PROBLEM — M79.10 MYALGIA: Status: RESOLVED | Noted: 2018-10-17 | Resolved: 2019-02-18

## 2019-02-18 PROBLEM — M25.561 CHRONIC PAIN OF RIGHT KNEE: Status: RESOLVED | Noted: 2018-11-28 | Resolved: 2019-02-18

## 2019-02-18 PROBLEM — M35.3 POLYMYALGIA RHEUMATICA (HCC): Status: RESOLVED | Noted: 2018-10-18 | Resolved: 2019-02-18

## 2019-02-18 PROBLEM — G89.29 CHRONIC PAIN OF RIGHT KNEE: Status: RESOLVED | Noted: 2018-11-28 | Resolved: 2019-02-18

## 2019-02-18 LAB — HBA1C MFR BLD: 5.1 %

## 2019-02-18 PROCEDURE — 83036 HEMOGLOBIN GLYCOSYLATED A1C: CPT | Performed by: NURSE PRACTITIONER

## 2019-02-18 PROCEDURE — 99213 OFFICE O/P EST LOW 20 MIN: CPT | Performed by: NURSE PRACTITIONER

## 2019-02-18 PROCEDURE — G8510 SCR DEP NEG, NO PLAN REQD: HCPCS | Performed by: NURSE PRACTITIONER

## 2019-02-18 RX ORDER — MULTIVITAMIN WITH IRON
250 TABLET ORAL DAILY
COMMUNITY

## 2019-02-18 RX ORDER — ASCORBIC ACID 500 MG
500 TABLET ORAL DAILY
COMMUNITY

## 2019-02-18 ASSESSMENT — ENCOUNTER SYMPTOMS
NAUSEA: 0
CONSTIPATION: 0
ANAL BLEEDING: 0
CHEST TIGHTNESS: 0
BACK PAIN: 0
SHORTNESS OF BREATH: 0
BLOOD IN STOOL: 0

## 2019-02-18 ASSESSMENT — PATIENT HEALTH QUESTIONNAIRE - PHQ9
SUM OF ALL RESPONSES TO PHQ QUESTIONS 1-9: 0
SUM OF ALL RESPONSES TO PHQ9 QUESTIONS 1 & 2: 0
1. LITTLE INTEREST OR PLEASURE IN DOING THINGS: 0
2. FEELING DOWN, DEPRESSED OR HOPELESS: 0
SUM OF ALL RESPONSES TO PHQ QUESTIONS 1-9: 0

## 2019-05-20 ENCOUNTER — OFFICE VISIT (OUTPATIENT)
Dept: FAMILY MEDICINE CLINIC | Age: 74
End: 2019-05-20
Payer: COMMERCIAL

## 2019-05-20 VITALS
HEART RATE: 92 BPM | DIASTOLIC BLOOD PRESSURE: 66 MMHG | SYSTOLIC BLOOD PRESSURE: 126 MMHG | BODY MASS INDEX: 30.12 KG/M2 | HEIGHT: 72 IN | OXYGEN SATURATION: 97 % | TEMPERATURE: 98.3 F | RESPIRATION RATE: 16 BRPM | WEIGHT: 222.4 LBS

## 2019-05-20 DIAGNOSIS — E78.2 MIXED HYPERLIPIDEMIA: ICD-10-CM

## 2019-05-20 DIAGNOSIS — I10 ESSENTIAL HYPERTENSION: Primary | ICD-10-CM

## 2019-05-20 PROCEDURE — 99213 OFFICE O/P EST LOW 20 MIN: CPT | Performed by: NURSE PRACTITIONER

## 2019-05-20 PROCEDURE — G8510 SCR DEP NEG, NO PLAN REQD: HCPCS | Performed by: NURSE PRACTITIONER

## 2019-05-20 RX ORDER — CYCLOBENZAPRINE HCL 10 MG
10 TABLET ORAL 3 TIMES DAILY PRN
COMMUNITY
End: 2019-05-20

## 2019-05-20 RX ORDER — LOSARTAN POTASSIUM AND HYDROCHLOROTHIAZIDE 12.5; 5 MG/1; MG/1
1 TABLET ORAL DAILY
Qty: 90 TABLET | Refills: 1 | Status: SHIPPED | OUTPATIENT
Start: 2019-05-20 | End: 2020-01-23

## 2019-05-20 ASSESSMENT — ENCOUNTER SYMPTOMS
CONSTIPATION: 0
NAUSEA: 0
BACK PAIN: 0
CHEST TIGHTNESS: 0

## 2019-05-20 ASSESSMENT — PATIENT HEALTH QUESTIONNAIRE - PHQ9
1. LITTLE INTEREST OR PLEASURE IN DOING THINGS: 0
SUM OF ALL RESPONSES TO PHQ9 QUESTIONS 1 & 2: 0
SUM OF ALL RESPONSES TO PHQ QUESTIONS 1-9: 0
SUM OF ALL RESPONSES TO PHQ QUESTIONS 1-9: 0
2. FEELING DOWN, DEPRESSED OR HOPELESS: 0

## 2019-05-20 NOTE — PROGRESS NOTES
mg by mouth daily      Calcium Carb-Cholecalciferol (CALCIUM/VITAMIN D) 600-400 MG-UNIT TABS Take by mouth      TURMERIC PO Take by mouth      losartan-hydrochlorothiazide (HYZAAR) 50-12.5 MG per tablet TAKE 1 TABLET BY MOUTH DAILY 90 tablet 1    Probiotic Product (SOLUBLE FIBER/PROBIOTICS PO) Take 1 capsule by mouth daily.  POTASSIUM GLUCONATE Take 1 tablet by mouth daily.  Multiple Vitamins-Minerals (VISION VITAMINS PO) Take 1 capsule by mouth daily.  esomeprazole (NEXIUM) 40 MG capsule Take 40 mg by mouth every morning (before breakfast).  fexofenadine (ALLEGRA ALLERGY) 180 MG tablet Take 180 mg by mouth daily.  aspirin EC 81 MG EC tablet Take 81 mg by mouth daily. No current facility-administered medications for this visit. Assessment:      1. Knee pain-following with [facility]  2. HTN-stable  3. Hyperlipidemia      Plan:      1. Angela Mcclellan was seen today for hypertension. Diagnoses and all orders for this visit:    Essential hypertension  -     Lipid Panel; Future  -     Comprehensive Metabolic Panel w/ Reflex to MG; Future    Mixed hyperlipidemia  -     losartan-hydrochlorothiazide (HYZAAR) 50-12.5 MG per tablet; Take 1 tablet by mouth daily      Planning to have fasting labs done tomorrow.              JAY JAY MARSHALL - CNP

## 2019-05-21 DIAGNOSIS — I10 ESSENTIAL HYPERTENSION: ICD-10-CM

## 2019-05-21 LAB
A/G RATIO: 1.3 (ref 1.1–2.2)
ALBUMIN SERPL-MCNC: 4.3 G/DL (ref 3.4–5)
ALP BLD-CCNC: 75 U/L (ref 40–129)
ALT SERPL-CCNC: 18 U/L (ref 10–40)
ANION GAP SERPL CALCULATED.3IONS-SCNC: 16 MMOL/L (ref 3–16)
AST SERPL-CCNC: 17 U/L (ref 15–37)
BILIRUB SERPL-MCNC: 0.6 MG/DL (ref 0–1)
BUN BLDV-MCNC: 13 MG/DL (ref 7–20)
CALCIUM SERPL-MCNC: 9.7 MG/DL (ref 8.3–10.6)
CHLORIDE BLD-SCNC: 101 MMOL/L (ref 99–110)
CHOLESTEROL, TOTAL: 173 MG/DL (ref 0–199)
CO2: 22 MMOL/L (ref 21–32)
CREAT SERPL-MCNC: 1 MG/DL (ref 0.8–1.3)
GFR AFRICAN AMERICAN: >60
GFR NON-AFRICAN AMERICAN: >60
GLOBULIN: 3.4 G/DL
GLUCOSE BLD-MCNC: 127 MG/DL (ref 70–99)
HDLC SERPL-MCNC: 65 MG/DL (ref 40–60)
LDL CHOLESTEROL CALCULATED: 94 MG/DL
POTASSIUM REFLEX MAGNESIUM: 4.2 MMOL/L (ref 3.5–5.1)
SODIUM BLD-SCNC: 139 MMOL/L (ref 136–145)
TOTAL PROTEIN: 7.7 G/DL (ref 6.4–8.2)
TRIGL SERPL-MCNC: 70 MG/DL (ref 0–150)
VLDLC SERPL CALC-MCNC: 14 MG/DL

## 2019-11-17 ENCOUNTER — HOSPITAL ENCOUNTER (EMERGENCY)
Age: 74
Discharge: HOME | End: 2019-11-17
Attending: EMERGENCY MEDICINE
Payer: COMMERCIAL

## 2019-11-17 ENCOUNTER — APPOINTMENT (OUTPATIENT)
Dept: GENERAL RADIOLOGY | Age: 74
End: 2019-11-17
Payer: COMMERCIAL

## 2019-11-17 VITALS
HEIGHT: 72 IN | RESPIRATION RATE: 16 BRPM | SYSTOLIC BLOOD PRESSURE: 128 MMHG | WEIGHT: 203 LBS | TEMPERATURE: 98.2 F | DIASTOLIC BLOOD PRESSURE: 74 MMHG | BODY MASS INDEX: 27.5 KG/M2 | OXYGEN SATURATION: 100 % | HEART RATE: 68 BPM

## 2019-11-17 DIAGNOSIS — S76.911A MUSCLE STRAIN OF RIGHT THIGH, INITIAL ENCOUNTER: Primary | ICD-10-CM

## 2019-11-17 PROCEDURE — 72170 X-RAY EXAM OF PELVIS: CPT

## 2019-11-17 PROCEDURE — 99283 EMERGENCY DEPT VISIT LOW MDM: CPT

## 2019-11-17 ASSESSMENT — PAIN DESCRIPTION - LOCATION: LOCATION: GROIN

## 2019-11-17 ASSESSMENT — PAIN DESCRIPTION - FREQUENCY: FREQUENCY: CONTINUOUS

## 2019-11-17 ASSESSMENT — PAIN DESCRIPTION - ORIENTATION: ORIENTATION: RIGHT

## 2019-11-17 ASSESSMENT — PAIN DESCRIPTION - DESCRIPTORS: DESCRIPTORS: CONSTANT

## 2019-11-17 ASSESSMENT — PAIN SCALES - GENERAL: PAINLEVEL_OUTOF10: 7

## 2019-11-17 ASSESSMENT — PAIN DESCRIPTION - PAIN TYPE: TYPE: ACUTE PAIN

## 2019-11-20 ENCOUNTER — OFFICE VISIT (OUTPATIENT)
Dept: FAMILY MEDICINE CLINIC | Age: 74
End: 2019-11-20
Payer: COMMERCIAL

## 2019-11-20 VITALS
HEART RATE: 92 BPM | DIASTOLIC BLOOD PRESSURE: 82 MMHG | OXYGEN SATURATION: 98 % | BODY MASS INDEX: 27.93 KG/M2 | HEIGHT: 72 IN | SYSTOLIC BLOOD PRESSURE: 122 MMHG | TEMPERATURE: 97.7 F | RESPIRATION RATE: 14 BRPM | WEIGHT: 206.2 LBS

## 2019-11-20 DIAGNOSIS — E78.2 MIXED HYPERLIPIDEMIA: ICD-10-CM

## 2019-11-20 DIAGNOSIS — I10 ESSENTIAL HYPERTENSION: Primary | ICD-10-CM

## 2019-11-20 DIAGNOSIS — M79.601 PAIN IN BOTH UPPER EXTREMITIES: ICD-10-CM

## 2019-11-20 DIAGNOSIS — M79.602 PAIN IN BOTH UPPER EXTREMITIES: ICD-10-CM

## 2019-11-20 DIAGNOSIS — R73.01 IMPAIRED FASTING GLUCOSE: ICD-10-CM

## 2019-11-20 DIAGNOSIS — S76.211S GROIN STRAIN, RIGHT, SEQUELA: ICD-10-CM

## 2019-11-20 LAB
A/G RATIO: 1 (ref 1.1–2.2)
ALBUMIN SERPL-MCNC: 3.8 G/DL (ref 3.4–5)
ALP BLD-CCNC: 69 U/L (ref 40–129)
ALT SERPL-CCNC: 19 U/L (ref 10–40)
ANION GAP SERPL CALCULATED.3IONS-SCNC: 17 MMOL/L (ref 3–16)
AST SERPL-CCNC: 15 U/L (ref 15–37)
BILIRUB SERPL-MCNC: 0.3 MG/DL (ref 0–1)
BUN BLDV-MCNC: 19 MG/DL (ref 7–20)
CALCIUM SERPL-MCNC: 9.7 MG/DL (ref 8.3–10.6)
CHLORIDE BLD-SCNC: 102 MMOL/L (ref 99–110)
CHOLESTEROL, TOTAL: 174 MG/DL (ref 0–199)
CO2: 23 MMOL/L (ref 21–32)
CREAT SERPL-MCNC: 0.9 MG/DL (ref 0.8–1.3)
GFR AFRICAN AMERICAN: >60
GFR NON-AFRICAN AMERICAN: >60
GLOBULIN: 3.8 G/DL
GLUCOSE BLD-MCNC: 118 MG/DL (ref 70–99)
HDLC SERPL-MCNC: 71 MG/DL (ref 40–60)
LDL CHOLESTEROL CALCULATED: 86 MG/DL
POTASSIUM REFLEX MAGNESIUM: 4.1 MMOL/L (ref 3.5–5.1)
SODIUM BLD-SCNC: 142 MMOL/L (ref 136–145)
TOTAL PROTEIN: 7.6 G/DL (ref 6.4–8.2)
TRIGL SERPL-MCNC: 86 MG/DL (ref 0–150)
VLDLC SERPL CALC-MCNC: 17 MG/DL

## 2019-11-20 PROCEDURE — 99213 OFFICE O/P EST LOW 20 MIN: CPT | Performed by: NURSE PRACTITIONER

## 2019-11-20 RX ORDER — M-VIT,TX,IRON,MINS/CALC/FOLIC 27MG-0.4MG
1 TABLET ORAL DAILY
COMMUNITY

## 2019-11-20 RX ORDER — IBUPROFEN 200 MG
200 TABLET ORAL EVERY 6 HOURS PRN
COMMUNITY

## 2019-11-20 ASSESSMENT — ENCOUNTER SYMPTOMS
BACK PAIN: 0
NAUSEA: 0
CHEST TIGHTNESS: 0
CONSTIPATION: 0

## 2019-11-21 LAB
ESTIMATED AVERAGE GLUCOSE: 119.8 MG/DL
HBA1C MFR BLD: 5.8 %

## 2020-02-21 ENCOUNTER — OFFICE VISIT (OUTPATIENT)
Dept: FAMILY MEDICINE CLINIC | Age: 75
End: 2020-02-21
Payer: COMMERCIAL

## 2020-02-21 ENCOUNTER — PATIENT MESSAGE (OUTPATIENT)
Dept: FAMILY MEDICINE CLINIC | Age: 75
End: 2020-02-21

## 2020-02-21 VITALS
WEIGHT: 204.6 LBS | SYSTOLIC BLOOD PRESSURE: 100 MMHG | HEART RATE: 90 BPM | TEMPERATURE: 98.4 F | DIASTOLIC BLOOD PRESSURE: 68 MMHG | HEIGHT: 72 IN | RESPIRATION RATE: 16 BRPM | BODY MASS INDEX: 27.71 KG/M2 | OXYGEN SATURATION: 97 %

## 2020-02-21 LAB
BASOPHILS ABSOLUTE: 0.1 K/UL (ref 0–0.2)
BASOPHILS RELATIVE PERCENT: 1.2 %
EOSINOPHILS ABSOLUTE: 0.3 K/UL (ref 0–0.6)
EOSINOPHILS RELATIVE PERCENT: 3 %
HCT VFR BLD CALC: 41.4 % (ref 40.5–52.5)
HEMOGLOBIN: 13.7 G/DL (ref 13.5–17.5)
LYMPHOCYTES ABSOLUTE: 1.9 K/UL (ref 1–5.1)
LYMPHOCYTES RELATIVE PERCENT: 19.7 %
MCH RBC QN AUTO: 27.2 PG (ref 26–34)
MCHC RBC AUTO-ENTMCNC: 33.1 G/DL (ref 31–36)
MCV RBC AUTO: 82.3 FL (ref 80–100)
MONOCYTES ABSOLUTE: 1.1 K/UL (ref 0–1.3)
MONOCYTES RELATIVE PERCENT: 11.3 %
NEUTROPHILS ABSOLUTE: 6.4 K/UL (ref 1.7–7.7)
NEUTROPHILS RELATIVE PERCENT: 64.8 %
PDW BLD-RTO: 16.1 % (ref 12.4–15.4)
PLATELET # BLD: 508 K/UL (ref 135–450)
PMV BLD AUTO: 7.3 FL (ref 5–10.5)
PROSTATE SPECIFIC ANTIGEN: 0.79 NG/ML (ref 0–4)
RBC # BLD: 5.03 M/UL (ref 4.2–5.9)
WBC # BLD: 9.9 K/UL (ref 4–11)

## 2020-02-21 PROCEDURE — G0439 PPPS, SUBSEQ VISIT: HCPCS | Performed by: NURSE PRACTITIONER

## 2020-02-21 SDOH — HEALTH STABILITY: MENTAL HEALTH: HOW MANY STANDARD DRINKS CONTAINING ALCOHOL DO YOU HAVE ON A TYPICAL DAY?: 1 OR 2

## 2020-02-21 SDOH — HEALTH STABILITY: MENTAL HEALTH: HOW OFTEN DO YOU HAVE A DRINK CONTAINING ALCOHOL?: 4 OR MORE TIMES A WEEK

## 2020-02-21 ASSESSMENT — PATIENT HEALTH QUESTIONNAIRE - PHQ9
2. FEELING DOWN, DEPRESSED OR HOPELESS: 0
1. LITTLE INTEREST OR PLEASURE IN DOING THINGS: 0
SUM OF ALL RESPONSES TO PHQ QUESTIONS 1-9: 0
SUM OF ALL RESPONSES TO PHQ9 QUESTIONS 1 & 2: 0
SUM OF ALL RESPONSES TO PHQ QUESTIONS 1-9: 0

## 2020-02-21 NOTE — PROGRESS NOTES
FIBER/PROBIOTICS PO) Take 1 capsule by mouth daily. Yes Historical Provider, MD   fexofenadine (ALLEGRA ALLERGY) 180 MG tablet Take 180 mg by mouth daily. Yes Historical Provider, MD   POTASSIUM GLUCONATE Take 1 tablet by mouth daily. Yes Historical Provider, MD   Multiple Vitamins-Minerals (VISION VITAMINS PO) Take 1 capsule by mouth daily. Yes Historical Provider, MD   esomeprazole (NEXIUM) 40 MG capsule Take 40 mg by mouth every morning (before breakfast).    Yes Historical Provider, MD       Past Medical History:   Diagnosis Date    Arthritis     Lema esophagus     Hyperlipidemia     Hypertension     Leg cramps     Polyp of colon     Ringing in ears        Past Surgical History:   Procedure Laterality Date    COLONOSCOPY  2010    polyps    COLONOSCOPY  08/01/2016    Diverticulosis    TOE SURGERY Bilateral 2011    toe nails Big toes removed    UPPER GASTROINTESTINAL ENDOSCOPY  4/9/12    lema's esophagus    UPPER GASTROINTESTINAL ENDOSCOPY  2018    GERD-3 year follow up   [address] [employer]  2014    lema's esophagus       Family History   Problem Relation Age of Onset    Heart Disease Father     Heart Attack Father     High Cholesterol Father     Mult Sclerosis Sister     Other Brother         [place] Fever       CareTeam (Including outside providers/suppliers regularly involved in providing care):   Patient Care Team:  JAY JAY Quintanilla CNP as PCP - General (Nurse Practitioner)  JAY JAY Quintanilla CNP as P[facility] Empaneled Provider  Cortney Corcoran DO as Consulting Physician (Orthopedic Surgery)    Wt Readings from Last 3 Encounters:   02/21/20 204 lb 9.6 oz (92.8 kg)   11/20/19 206 lb 3.2 oz (93.5 kg)   11/17/19 203 lb (92.1 kg)     Vitals:    02/21/20 0755   BP: 100/68   Site: Left Upper Arm   Position: Sitting   Cuff Size: Medium Adult   Pulse: 90   Resp: 16   Temp: 98.4 °F (36.9 °C)   TempSrc: Oral   SpO2: 97%   Weight: 204 lb 9.6 oz (92.8 09/13/2015, 08/15/2016, 09/25/2017, 08/01/2018, 09/18/2019    Pneumococcal Conjugate 13-valent ([id number]) 11/09/2015    Pneumococcal Polysaccharide ([id number]) 11/01/2012    Tdap (Boostrix, Adacel) 11/21/2013    Zoster Live (Zostavax) 07/11/2013        Health Maintenance   Topic Date Due    Shingles Vaccine (2 of 3) 09/05/2013    Annual Wellness Visit (AWV)  05/29/2019    A1C test (Diabetic or Prediabetic)  11/20/2020    Potassium monitoring  11/20/2020    Creatinine monitoring  11/20/2020    DTaP/Tdap/Td vaccine (2 - Td) 11/21/2023    Lipid screen  11/20/2024    Colon cancer screen colonoscopy  12/27/2028    Flu vaccine  Completed    Pneumococcal 65+ years Vaccine  Completed    Hepatitis C screen  Completed    Hepatitis A vaccine  Aged Out    Hepatitis B vaccine  Aged Out    Hib vaccine  Aged Out    Meningococcal (ACWY) vaccine  Aged Out     Recommendations for [employer] Due: see orders and patient instructions/AVS.  . Recommended screening schedule for the next 5-10 years is provided to the patient in written form: see Patient Asia Campuzano was seen today for medicare awv. Diagnoses and all orders for this visit:    Prostate cancer screening  -     Psa screening    Impaired fasting glucose  -     Hemoglobin A1C    Anemia, unspecified type  -     CBC Auto Differential    Routine general medical examination at a health care facility    Primary osteoarthritis of right knee  -     Handicap Placard MISC; by Does not apply route      Consideration for shingrix vaccine.

## 2020-02-22 LAB
ESTIMATED AVERAGE GLUCOSE: 111.2 MG/DL
HBA1C MFR BLD: 5.5 %

## 2020-05-18 ENCOUNTER — TELEMEDICINE (OUTPATIENT)
Dept: FAMILY MEDICINE CLINIC | Age: 75
End: 2020-05-18
Payer: COMMERCIAL

## 2020-05-18 VITALS — HEART RATE: 72 BPM | SYSTOLIC BLOOD PRESSURE: 125 MMHG | TEMPERATURE: 98 F | DIASTOLIC BLOOD PRESSURE: 71 MMHG

## 2020-05-18 PROCEDURE — 99213 OFFICE O/P EST LOW 20 MIN: CPT | Performed by: NURSE PRACTITIONER

## 2020-05-18 ASSESSMENT — ENCOUNTER SYMPTOMS
CONSTIPATION: 0
SHORTNESS OF BREATH: 0

## 2020-05-18 NOTE — PROGRESS NOTES
2020    TELEHEALTH EVALUATION -- Audio/Visual (During WPMXQ-06 public health emergency)    HPI:     Timmy (:  1945) has requested an audio/video evaluation for the following concern(s): For routine follow up. Arthritis is better related to using a CBD under the tongue. Using [facility] ointment called muscle balm. Camphor and menthol combination. Taking ibuprofen only as needed. Review of Systems   Constitutional: Negative for chills and fever. Respiratory: Negative for shortness of breath. Cardiovascular: Negative for chest pain. Gastrointestinal: Negative for constipation. Genitourinary: Negative for difficulty urinating. Musculoskeletal: Positive for arthralgias. Negative for gait problem. Prior to Visit Medications    Medication Sig Taking? Authorizing Provider   Zinc Sulfate (ZINC 15 PO) Take 15 mg by mouth daily Yes Historical Provider, MD   Menaquinone-7 (VITAMIN K2 PO) Take 800 mcg by mouth daily Yes Historical Provider, MD   Boswellia Ela (BOSWELLIA PO) Take 277 mg by mouth daily Yes Historical Provider, MD   Psyllium (METAMUCIL FIBER PO) Take by mouth daily 1 teaspoon daily. Yes Historical Provider, MD   NONFORMULARY Take 2,700 mg by mouth daily Curamin, inflammation and joint pain.  Yes Historical Provider, MD   Handicap Placard MISC by Does not apply route Yes JAY JAY Rogers CNP   NIFEdipine (ADALAT CC) 90 MG extended release tablet TAKE 1 TABLET BY MOUTH EVERY DAY Yes JAY JAY Rogers CNP   losartan-hydrochlorothiazide (HYZAAR) 50-12.5 MG per tablet TAKE 1 TABLET BY MOUTH EVERY DAY Yes JAY JAY Rogers CNP   Multiple Vitamins-Minerals (THERAPEUTIC MULTIVITAMIN-MINERALS) tablet Take 1 tablet by mouth daily Yes Historical Provider, MD   ibuprofen (ADVIL;MOTRIN) 200 MG tablet Take 200 mg by mouth every 6 hours as needed for Pain Yes Historical Provider, MD   Omega-3 Fatty Acids (FISH OIL) 600 MG CAPS Take by mouth Yes Historical Provider, MD   vitamin C (ASCORBIC ACID) 500 MG tablet Take 500 mg by mouth daily Yes Historical Provider, MD   magnesium (MAGNESIUM-OXIDE) 250 MG TABS tablet Take 250 mg by mouth daily Yes Historical Provider, MD   Calcium Carb-Cholecalciferol (CALCIUM/VITAMIN D) 600-400 MG-UNIT TABS Take by mouth Yes Historical Provider, MD   TURMERIC PO Take by mouth Yes Historical Provider, MD   Probiotic Product (SOLUBLE FIBER/PROBIOTICS PO) Take 1 capsule by mouth daily. Yes Historical Provider, MD   fexofenadine (ALLEGRA ALLERGY) 180 MG tablet Take 180 mg by mouth daily. Yes Historical Provider, MD   POTASSIUM GLUCONATE Take 1 tablet by mouth daily. Yes Historical Provider, MD   Multiple Vitamins-Minerals (VISION VITAMINS PO) Take 1 capsule by mouth daily. Yes Historical Provider, MD   esomeprazole (NEXIUM) 40 MG capsule Take 40 mg by mouth every morning (before breakfast). Yes Historical Provider, MD   diclofenac sodium 1 % GEL Apply 4 g topically 4 times daily  Patient not taking: Reported on 5/18/2020  DOMENICA Parkinson       Social History     Tobacco Use    Smoking status: Never Smoker    Smokeless tobacco: Never Used   Substance Use Topics    Alcohol use: Yes     Alcohol/week: 7.0 standard drinks     Types: 7 Shots of liquor per week     Frequency: 4 or more times a week     Drinks per session: 1 or 2     Comment: occ at night    Drug use:  No            PHYSICAL EXAMINATION:  [ INSTRUCTIONS:  \"[x]\" Indicates a positive item  \"[]\" Indicates a negative item  -- DELETE ALL ITEMS NOT EXAMINED]  Vital Signs: (As obtained by patient/caregiver or practitioner observation)    Blood pressure-  Heart rate-    Respiratory rate-    Temperature-  Pulse oximetry-     Constitutional: [x] Appears well-developed and well-nourished [x] No apparent distress      [] Abnormal-   Mental status  [x] Alert and awake  [x] Oriented to person/place/time []Able to follow commands      Eyes:  EOM    [x]  Normal  [] Abnormal-  Sclera  [] Normal  [] Abnormal -         Discharge []  None visible  [] Abnormal -    HENT:   [x] Normocephalic, atraumatic. [] Abnormal   [] Mouth/Throat: Mucous membranes are moist.     External Ears [] Normal  [] Abnormal-     Neck: [] No visualized mass     Pulmonary/Chest: [x] Respiratory effort normal.  [x] No visualized signs of difficulty breathing or respiratory distress        [] Abnormal-      Musculoskeletal:   [] Normal gait with no signs of ataxia         [x] Normal range of motion of neck        [] Abnormal-       Neurological:        [x] No Facial Asymmetry (Cranial nerve 7 motor function) (limited exam to video visit)          [] No gaze palsy        [] Abnormal-         Skin:        [] No significant exanthematous lesions or discoloration noted on facial skin         [] Abnormal-            Psychiatric:       [x] Normal Affect [] No Hallucinations        [] Abnormal-     Other pertinent observable physical exam findings-     ASSESSMENT/PLAN:      No change in medication. Last labs reviewed, stable    Due for follow up ascending aortic aneurysm noted CT scan 12/2018. Will order US for evaluation. Declined need for refills today. May continue OTC supplements as currend. Follow up in office in 3 months. Will repeat HgA1c at that time. Pratik Darby is a 69 y.o. male being evaluated by a Virtual Visit (video visit) encounter to address concerns as mentioned above. A caregiver was present when appropriate. Due to this being a TeleHealth encounter ([facility]), evaluation of the following organ systems was limited: Vitals/Constitutional/EENT/Resp/CV/GI//MS/Neuro/Skin/Heme-Lymph-Imm.   Pursuant to the emergency declaration under the [facility], [facility] waiver authority and the [employer] Act, this Virtual Visit was conducted with patient's (and/or legal guardian's) consent, to

## 2020-05-28 ENCOUNTER — HOSPITAL ENCOUNTER (OUTPATIENT)
Dept: ULTRASOUND IMAGING | Age: 75
Discharge: HOME | End: 2020-05-28
Payer: COMMERCIAL

## 2020-05-28 PROCEDURE — 76775 US EXAM ABDO BACK WALL LIM: CPT

## 2020-08-19 ENCOUNTER — TELEMEDICINE (OUTPATIENT)
Dept: FAMILY MEDICINE CLINIC | Age: 75
End: 2020-08-19
Payer: COMMERCIAL

## 2020-08-19 PROBLEM — E11.9 TYPE 2 DIABETES MELLITUS WITHOUT COMPLICATION, WITHOUT LONG-TERM CURRENT USE OF INSULIN (HCC): Status: ACTIVE | Noted: 2020-08-19

## 2020-08-19 PROCEDURE — 99213 OFFICE O/P EST LOW 20 MIN: CPT | Performed by: NURSE PRACTITIONER

## 2020-08-19 ASSESSMENT — ENCOUNTER SYMPTOMS
NAUSEA: 0
DIARRHEA: 0
CHEST TIGHTNESS: 0
BACK PAIN: 0

## 2020-08-19 NOTE — PROGRESS NOTES
2020    TELEHEALTH EVALUATION -- Audio/Visual (During RQEXQ-58 public health emergency)    HPI:    Kaya Arteaga (:  1945) has requested an audio/video evaluation for the following concern(s): For routine follow up HTN. Feeling well. Connected by video but he is unable to hear me. Called to communicate. Staying active. Arthritis is down a little, walking better. Trainer downsized her gyn and gave him parallel bars in his basement. Gave him some bands as well. Review of Systems   Constitutional: Negative for chills, fever and unexpected weight change. Respiratory: Negative for chest tightness. Cardiovascular: Negative for chest pain and palpitations. Gastrointestinal: Negative for diarrhea and nausea. Musculoskeletal: Negative for arthralgias, back pain and gait problem. Neurological: Negative for dizziness and headaches. Psychiatric/Behavioral: Negative. Prior to Visit Medications    Medication Sig Taking? Authorizing Provider   NIFEdipine (ADALAT CC) 90 MG extended release tablet TAKE 1 TABLET BY MOUTH EVERY DAY Yes JAY JAY Rogers - CNP   Zinc Sulfate (ZINC 15 PO) Take 15 mg by mouth daily Yes Historical Provider, MD   Menaquinone-7 (VITAMIN K2 PO) Take 800 mcg by mouth daily Yes Historical Provider, MD   Boswellia Ela (BOSWELLIA PO) Take 277 mg by mouth daily Yes Historical Provider, MD   Psyllium (METAMUCIL FIBER PO) Take by mouth daily 1 teaspoon daily. Yes Historical Provider, MD   NONFORMULARY Take 2,700 mg by mouth daily Curamin, inflammation and joint pain.  Yes Historical Provider, MD   UNABLE TO FIND CBD oil sub liguial Yes Historical Provider, MD   camphor-menthol (SARNA) 0.5-0.5 % lotion Apply topically as needed for Itching [facility] brand Yes Historical Provider, MD   Handicap Placard MISC by Does not apply route Yes JAY JAY Barrientos - CNP   losartan-hydrochlorothiazide (HYZAAR) 50-12.5 MG per tablet TAKE 1 TABLET BY MOUTH EVERY DAY Yes Silvia Kilpatrick, APRN - CNP   Multiple Vitamins-Minerals (THERAPEUTIC MULTIVITAMIN-MINERALS) tablet Take 1 tablet by mouth daily Yes Historical Provider, MD   ibuprofen (ADVIL;MOTRIN) 200 MG tablet Take 200 mg by mouth every 6 hours as needed for Pain Yes Historical Provider, MD   Omega-3 Fatty Acids (FISH OIL) 600 MG CAPS Take by mouth Yes Historical Provider, MD   vitamin C (ASCORBIC ACID) 500 MG tablet Take 500 mg by mouth daily Yes Historical Provider, MD   magnesium (MAGNESIUM-OXIDE) 250 MG TABS tablet Take 250 mg by mouth daily Yes Historical Provider, MD   Calcium Carb-Cholecalciferol (CALCIUM/VITAMIN D) 600-400 MG-UNIT TABS Take by mouth Yes Historical Provider, MD   TURMERIC PO Take by mouth Yes Historical Provider, MD   Probiotic Product (SOLUBLE FIBER/PROBIOTICS PO) Take 1 capsule by mouth daily. Yes Historical Provider, MD   fexofenadine (ALLEGRA ALLERGY) 180 MG tablet Take 180 mg by mouth daily. Yes Historical Provider, MD   POTASSIUM GLUCONATE Take 1 tablet by mouth daily. Yes Historical Provider, MD   Multiple Vitamins-Minerals (VISION VITAMINS PO) Take 1 capsule by mouth daily. Yes Historical Provider, MD   esomeprazole (NEXIUM) 40 MG capsule Take 40 mg by mouth every morning (before breakfast). Yes Historical Provider, MD       Social History     Tobacco Use    Smoking status: Never Smoker    Smokeless tobacco: Never Used   Substance Use Topics    Alcohol use: Yes     Alcohol/week: 7.0 standard drinks     Types: 7 Shots of liquor per week     Frequency: 4 or more times a week     Drinks per session: 1 or 2     Comment: occ at night    Drug use:  No            PHYSICAL EXAMINATION:  [ INSTRUCTIONS:  \"[x]\" Indicates a positive item  \"[]\" Indicates a negative item  -- DELETE ALL ITEMS NOT EXAMINED]  Vital Signs: (As obtained by patient/caregiver or practitioner observation)    Blood pressure-  Heart rate-    Respiratory rate-    Temperature-  Pulse oximetry-     Constitutional: [x] Appears well-developed and well-nourished [x] No apparent distress      [] Abnormal-   Mental status  [x] Alert and awake  [x] Oriented to person/place/time []Able to follow commands      Eyes:  EOM    [x]  Normal  [] Abnormal-  Sclera  []  Normal  [] Abnormal -         Discharge []  None visible  [] Abnormal -    HENT:   [] Normocephalic, atraumatic. [] Abnormal   [] Mouth/Throat: Mucous membranes are moist.     External Ears [x] Normal  [] Abnormal-     Neck: [] No visualized mass     Pulmonary/Chest: [x] Respiratory effort normal.  [x] No visualized signs of difficulty breathing or respiratory distress        [] Abnormal-      Musculoskeletal:   [] Normal gait with no signs of ataxia         [x] Normal range of motion of neck        [] Abnormal-       Neurological:        [x] No Facial Asymmetry (Cranial nerve 7 motor function) (limited exam to video visit)          [] No gaze palsy        [] Abnormal-         Skin:        [] No significant exanthematous lesions or discoloration noted on facial skin         [] Abnormal-            Psychiatric:       [x] Normal Affect [] No Hallucinations        [] Abnormal-     Other pertinent observable physical exam findings-     ASSESSMENT/PLAN:    Discussed last week of September or October. Has already obtained his flu vaccine this year at the pharmacy last week. No refills needed today. Previous labs reviewed. Encouraged to continue to be as active as possible to maintain stable blood sugar. Planning to continue CBD oil and creme daily. Dionna Gonzalez is a 70 y.o. male being evaluated by a Virtual Visit (video visit) encounter to address concerns as mentioned above. A caregiver was present when appropriate. Due to this being a TeleHealth encounter (During [place] [address] public health emergency), evaluation of the following organ systems was limited: Vitals/Constitutional/EENT/Resp/CV/GI//MS/Neuro/Skin/Heme-Lymph-Imm.   Pursuant to the emergency declaration under the [address] [place] [address], [address] [place] [address] authority and the [employer] Act, this Virtual Visit was conducted with patient's (and/or legal guardian's) consent, to reduce the patient's risk of exposure to COVID-19 and provide necessary medical care. The patient (and/or legal guardian) has also been advised to contact this office for worsening conditions or problems, and seek emergency medical treatment and/or call 911 if deemed necessary. Patient identification was verified at the start of the visit: Yes    Total time spent on this encounter: 15 min    Services were provided through a video synchronous discussion virtually to substitute for in-person clinic visit. Patient and provider were located at their individual homes. --JAY JAY Lazcano CNP on 8/19/2020 at 9:50 AM    An electronic signature was used to authenticate this note.

## 2020-10-22 NOTE — TELEPHONE ENCOUNTER
Last office visit 8/19/2020     Last written 1- x 2 refills    Next office visit scheduled 11/20/2020    Requested Prescriptions     Pending Prescriptions Disp Refills    losartan-hydroCHLOROthiazide (HYZAAR) 50-12.5 MG per tablet 90 tablet 2     Sig: TAKE 1 TABLET BY MOUTH EVERY DAY

## 2020-10-23 RX ORDER — LOSARTAN POTASSIUM AND HYDROCHLOROTHIAZIDE 12.5; 5 MG/1; MG/1
TABLET ORAL
Qty: 90 TABLET | Refills: 3 | Status: SHIPPED | OUTPATIENT
Start: 2020-10-23 | End: 2021-10-19

## 2020-11-20 ENCOUNTER — OFFICE VISIT (OUTPATIENT)
Dept: FAMILY MEDICINE CLINIC | Age: 75
End: 2020-11-20
Payer: COMMERCIAL

## 2020-11-20 VITALS
HEART RATE: 103 BPM | OXYGEN SATURATION: 97 % | BODY MASS INDEX: 29.92 KG/M2 | TEMPERATURE: 97.2 F | DIASTOLIC BLOOD PRESSURE: 68 MMHG | SYSTOLIC BLOOD PRESSURE: 110 MMHG | RESPIRATION RATE: 16 BRPM | WEIGHT: 220.6 LBS

## 2020-11-20 PROBLEM — R73.01 IMPAIRED FASTING GLUCOSE: Status: RESOLVED | Noted: 2018-08-02 | Resolved: 2020-11-20

## 2020-11-20 LAB
A/G RATIO: 1.5 (ref 1.1–2.2)
ALBUMIN SERPL-MCNC: 4.5 G/DL (ref 3.4–5)
ALP BLD-CCNC: 84 U/L (ref 40–129)
ALT SERPL-CCNC: 22 U/L (ref 10–40)
ANION GAP SERPL CALCULATED.3IONS-SCNC: 11 MMOL/L (ref 3–16)
AST SERPL-CCNC: 22 U/L (ref 15–37)
BILIRUB SERPL-MCNC: 0.3 MG/DL (ref 0–1)
BUN BLDV-MCNC: 16 MG/DL (ref 7–20)
CALCIUM SERPL-MCNC: 10.2 MG/DL (ref 8.3–10.6)
CHLORIDE BLD-SCNC: 104 MMOL/L (ref 99–110)
CHOLESTEROL, TOTAL: 169 MG/DL (ref 0–199)
CO2: 27 MMOL/L (ref 21–32)
CREAT SERPL-MCNC: 1 MG/DL (ref 0.8–1.3)
CREATININE URINE POCT: 300
GFR AFRICAN AMERICAN: >60
GFR NON-AFRICAN AMERICAN: >60
GLOBULIN: 3.1 G/DL
GLUCOSE BLD-MCNC: 121 MG/DL (ref 70–99)
HBA1C MFR BLD: 5.6 %
HDLC SERPL-MCNC: 64 MG/DL (ref 40–60)
LDL CHOLESTEROL CALCULATED: 89 MG/DL
MICROALBUMIN/CREAT 24H UR: 30 MG/G{CREAT}
MICROALBUMIN/CREAT UR-RTO: <30
POTASSIUM REFLEX MAGNESIUM: 4.2 MMOL/L (ref 3.5–5.1)
SODIUM BLD-SCNC: 142 MMOL/L (ref 136–145)
TOTAL PROTEIN: 7.6 G/DL (ref 6.4–8.2)
TRIGL SERPL-MCNC: 79 MG/DL (ref 0–150)
TSH SERPL DL<=0.05 MIU/L-ACNC: 3.52 UIU/ML (ref 0.27–4.2)
VLDLC SERPL CALC-MCNC: 16 MG/DL

## 2020-11-20 PROCEDURE — 99213 OFFICE O/P EST LOW 20 MIN: CPT | Performed by: NURSE PRACTITIONER

## 2020-11-20 PROCEDURE — 82044 UR ALBUMIN SEMIQUANTITATIVE: CPT | Performed by: NURSE PRACTITIONER

## 2020-11-20 PROCEDURE — 83036 HEMOGLOBIN GLYCOSYLATED A1C: CPT | Performed by: NURSE PRACTITIONER

## 2020-11-20 RX ORDER — TRIAMCINOLONE ACETONIDE 1 MG/G
CREAM TOPICAL
Qty: 15 G | Refills: 0 | Status: SHIPPED | OUTPATIENT
Start: 2020-11-20 | End: 2020-12-28

## 2020-11-20 ASSESSMENT — PATIENT HEALTH QUESTIONNAIRE - PHQ9
SUM OF ALL RESPONSES TO PHQ QUESTIONS 1-9: 0
SUM OF ALL RESPONSES TO PHQ9 QUESTIONS 1 & 2: 0
2. FEELING DOWN, DEPRESSED OR HOPELESS: 0
1. LITTLE INTEREST OR PLEASURE IN DOING THINGS: 0
SUM OF ALL RESPONSES TO PHQ QUESTIONS 1-9: 0
SUM OF ALL RESPONSES TO PHQ QUESTIONS 1-9: 0

## 2020-11-20 NOTE — PROGRESS NOTES
Subjective:      Patient ID: Baldo Scruggs is a 70 y.o. male. HPI     For the past week with itching around ankles. Thinks is bug bites but not able to look at them related to arthritis. Using bactene. Has gained some weight and not able to lose. Pre Covid was 200. Gained 12 pounds first month staying at home. Was going to gym. Closed. Has parallel and other equipment in his basement. Continues to walk. [facility] in August.     Review of Systems   All other systems reviewed and are negative. Objective:   Physical Exam  Constitutional:       General: He is not in acute distress. Appearance: Normal appearance. He is well-developed. He is obese. Cardiovascular:      Rate and Rhythm: Normal rate and regular rhythm. Heart sounds: Normal heart sounds. Pulmonary:      Effort: Pulmonary effort is normal.      Breath sounds: Normal breath sounds. Abdominal:      General: Bowel sounds are normal.      Palpations: Abdomen is soft. Musculoskeletal: Normal range of motion. Skin:     General: Skin is warm and dry. Neurological:      Mental Status: He is alert and oriented to person, place, and time. Assessment:            Plan:      Terra Duran was seen today for hypertension and other. Diagnoses and all orders for this visit:    Type 2 diabetes mellitus without complication, without long-term current use of insulin ([facility])  -     POCT glycosylated hemoglobin (Hb A1C)  -      DIABETES FOOT EXAM  -     POCT microalbumin    Essential hypertension  -     Comprehensive Metabolic Panel w/ Reflex to MG    Mixed hyperlipidemia  -     Lipid Panel  -     Comprehensive Metabolic Panel w/ Reflex to MG    Weight gain  -     TSH without Reflex    Dermatitis  -     triamcinolone (KENALOG) 0.1 % cream; Apply small amount 2 times daily      Discussed smaller portion sizes for limiting calories    Continue activity as tolerated.          ABIGAIL HAWTHORNE, APRN - CNP

## 2021-05-18 ASSESSMENT — LIFESTYLE VARIABLES
HOW OFTEN DURING THE LAST YEAR HAVE YOU FOUND THAT YOU WERE NOT ABLE TO STOP DRINKING ONCE YOU HAD STARTED: 0
HOW MANY STANDARD DRINKS CONTAINING ALCOHOL DO YOU HAVE ON A TYPICAL DAY: 0
HAVE YOU OR SOMEONE ELSE BEEN INJURED AS A RESULT OF YOUR DRINKING: 0
HOW MANY STANDARD DRINKS CONTAINING ALCOHOL DO YOU HAVE ON A TYPICAL DAY: ONE OR TWO
HOW OFTEN DO YOU HAVE A DRINK CONTAINING ALCOHOL: TWO TO THREE TIMES A WEEK
HOW OFTEN DURING THE LAST YEAR HAVE YOU NEEDED AN ALCOHOLIC DRINK FIRST THING IN THE MORNING TO GET YOURSELF GOING AFTER A NIGHT OF HEAVY DRINKING: 0
HOW OFTEN DURING THE LAST YEAR HAVE YOU FOUND THAT YOU WERE NOT ABLE TO STOP DRINKING ONCE YOU HAD STARTED: NEVER
HOW OFTEN DURING THE LAST YEAR HAVE YOU BEEN UNABLE TO REMEMBER WHAT HAPPENED THE NIGHT BEFORE BECAUSE YOU HAD BEEN DRINKING: NEVER
HAS A RELATIVE, FRIEND, DOCTOR, OR ANOTHER HEALTH PROFESSIONAL EXPRESSED CONCERN ABOUT YOUR DRINKING OR SUGGESTED YOU CUT DOWN: NO
HOW OFTEN DURING THE LAST YEAR HAVE YOU NEEDED AN ALCOHOLIC DRINK FIRST THING IN THE MORNING TO GET YOURSELF GOING AFTER A NIGHT OF HEAVY DRINKING: NEVER
HOW OFTEN DURING THE LAST YEAR HAVE YOU HAD A FEELING OF GUILT OR REMORSE AFTER DRINKING: 0
HOW OFTEN DURING THE LAST YEAR HAVE YOU FAILED TO DO WHAT WAS NORMALLY EXPECTED FROM YOU BECAUSE OF DRINKING: NEVER
HOW OFTEN DO YOU HAVE SIX OR MORE DRINKS ON ONE OCCASION: 0
HOW OFTEN DURING THE LAST YEAR HAVE YOU FAILED TO DO WHAT WAS NORMALLY EXPECTED FROM YOU BECAUSE OF DRINKING: 0
HOW OFTEN DURING THE LAST YEAR HAVE YOU HAD A FEELING OF GUILT OR REMORSE AFTER DRINKING: NEVER
HOW OFTEN DO YOU HAVE A DRINK CONTAINING ALCOHOL: 3
AUDIT TOTAL SCORE: 3
AUDIT-C TOTAL SCORE: 0
AUDIT TOTAL SCORE: 0
HOW OFTEN DO YOU HAVE SIX OR MORE DRINKS ON ONE OCCASION: NEVER
HOW OFTEN DURING THE LAST YEAR HAVE YOU BEEN UNABLE TO REMEMBER WHAT HAPPENED THE NIGHT BEFORE BECAUSE YOU HAD BEEN DRINKING: 0
HAVE YOU OR SOMEONE ELSE BEEN INJURED AS A RESULT OF YOUR DRINKING: NO

## 2021-05-18 ASSESSMENT — PATIENT HEALTH QUESTIONNAIRE - PHQ9
2. FEELING DOWN, DEPRESSED OR HOPELESS: 0
SUM OF ALL RESPONSES TO PHQ QUESTIONS 1-9: 0
SUM OF ALL RESPONSES TO PHQ QUESTIONS 1-9: 0

## 2021-05-20 ENCOUNTER — VIRTUAL VISIT (OUTPATIENT)
Dept: FAMILY MEDICINE CLINIC | Age: 76
End: 2021-05-20
Payer: COMMERCIAL

## 2021-05-20 DIAGNOSIS — K22.70 BARRETT'S ESOPHAGUS WITHOUT DYSPLASIA: ICD-10-CM

## 2021-05-20 DIAGNOSIS — Z00.00 ROUTINE GENERAL MEDICAL EXAMINATION AT A HEALTH CARE FACILITY: Primary | ICD-10-CM

## 2021-05-20 DIAGNOSIS — M54.50 ACUTE MIDLINE LOW BACK PAIN WITHOUT SCIATICA: ICD-10-CM

## 2021-05-20 DIAGNOSIS — E11.9 TYPE 2 DIABETES MELLITUS WITHOUT COMPLICATION, WITHOUT LONG-TERM CURRENT USE OF INSULIN (HCC): ICD-10-CM

## 2021-05-20 DIAGNOSIS — I71.21 ANEURYSM OF ASCENDING AORTA: ICD-10-CM

## 2021-05-20 PROCEDURE — G0439 PPPS, SUBSEQ VISIT: HCPCS | Performed by: NURSE PRACTITIONER

## 2021-05-20 RX ORDER — METHOCARBAMOL 750 MG/1
750 TABLET, FILM COATED ORAL 3 TIMES DAILY PRN
Qty: 30 TABLET | Refills: 0 | Status: SHIPPED | OUTPATIENT
Start: 2021-05-20 | End: 2021-07-23

## 2021-05-20 RX ORDER — ESOMEPRAZOLE MAGNESIUM 40 MG/1
40 CAPSULE, DELAYED RELEASE ORAL
Qty: 30 CAPSULE | Refills: 5 | Status: SHIPPED | OUTPATIENT
Start: 2021-05-20 | End: 2021-07-23

## 2021-05-20 NOTE — PROGRESS NOTES
Medicare Annual Wellness Visit  Name: Lisa Parker Date: 2021   MRN: <[id number]> Sex: Male   Age: 70 y.o. Ethnicity: Non-/Non    : 1945 Race: Leatha Harris is here for Hypertension, Hyperlipidemia, Medicare AWV, and Other (cramps from arthritis )    Screenings for behavioral, psychosocial and functional/safety risks, and cognitive dysfunction are all negative except as indicated below. These results, as well as other patient data from the [address] form, are documented in Flowsheets linked to this Encounter. Having some spasms back, neck, sides, feet, legs at times. Feels related to arthritis. Had similar symptoms when seeing Dr. Ruth Rojas and took flexeril. Using tylenol, ice, heat with some relief. Not occurring daily. 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 Has had 2 pizer covid doses. Previous mention of AAA. US 2020 was stable. Had hearing test, audiogram, scan. Allergies   Allergen Reactions    Carafate [Sucralfate] Nausea Only    Cat Hair Extract Other (See Comments)     sneezing    Pravachol [Pravastatin] Other (See Comments)     Myalgia      Vytorin [Ezetimibe-Simvastatin] Other (See Comments)     Muscle cramps           Prior to Visit Medications    Medication Sig Taking?  Authorizing Provider   methocarbamol (ROBAXIN-750) 750 MG tablet Take 1 tablet by mouth 3 times daily as needed (back pain) Yes JAY JAY Rogers CNP   esomeprazole (NEXIUM) 40 MG delayed release capsule Take 1 capsule by mouth every morning (before breakfast) Yes JAY JAY Rogers CNP   NIFEdipine (ADALAT CC) 90 MG extended release tablet TAKE 1 TABLET BY MOUTH EVERY DAY Yes JAY JAY Rogers CNP   triamcinolone (KENALOG) 0.1 % cream APPLY SMALL AMOUNT 2 TIMES DAILY Yes JAY JAY Rogers CNP   losartan-hydroCHLOROthiazide (HYZAAR) 50-12.5 MG per tablet TAKE 1 TABLET BY MOUTH EVERY DAY Yes JAY JAY Lyles - CNP   Zinc Sulfate (ZINC 15 PO) Take 15 mg by mouth daily Yes Historical Provider, MD   Menaquinone-7 (VITAMIN K2 PO) Take 800 mcg by mouth daily Yes Historical Provider, MD Carrillo Ela (BOSWELLIA PO) Take 277 mg by mouth daily Yes Historical Provider, MD   Psyllium (METAMUCIL FIBER PO) Take by mouth daily 1 teaspoon daily. Yes Historical Provider, MD   UNABLE TO FIND CBD oil sub liguial Yes Historical Provider, MD   Handicap Placard MISC by Does not apply route Yes JAY JAY Lyles - CNP   Multiple Vitamins-Minerals (THERAPEUTIC MULTIVITAMIN-MINERALS) tablet Take 1 tablet by mouth daily Yes Historical Provider, MD   ibuprofen (ADVIL;MOTRIN) 200 MG tablet Take 200 mg by mouth every 6 hours as needed for Pain Yes Historical Provider, MD   Omega-3 Fatty Acids (FISH OIL) 600 MG CAPS Take by mouth Yes Historical Provider, MD   vitamin C (ASCORBIC ACID) 500 MG tablet Take 500 mg by mouth daily Yes Historical Provider, MD   magnesium (MAGNESIUM-OXIDE) 250 MG TABS tablet Take 250 mg by mouth daily Yes Historical Provider, MD   Calcium Carb-Cholecalciferol (CALCIUM/VITAMIN D) 600-400 MG-UNIT TABS Take by mouth Yes Historical Provider, MD   TURMERIC PO Take 2,700 mg by mouth daily  Yes Historical Provider, MD   Probiotic Product (SOLUBLE FIBER/PROBIOTICS PO) Take 1 capsule by mouth daily. Yes Historical Provider, MD   fexofenadine (ALLEGRA ALLERGY) 180 MG tablet Take 180 mg by mouth daily. Yes Historical Provider, MD   POTASSIUM GLUCONATE Take 1 tablet by mouth daily. Yes Historical Provider, MD   Multiple Vitamins-Minerals (VISION VITAMINS PO) Take 1 capsule by mouth daily.    Yes Historical Provider, MD razomentantoinette VALDEZ [facility]) 0.5-0.5 % lotion Apply topically as needed for Itching [facility]  Patient not taking: Reported on 5/20/2021  Historical Provider, MD         Past Medical History:   Diagnosis Date    Arthritis     Geller esophagus     Hyperlipidemia     Hypertension     Leg cramps     Polyp of colon     Ringing in ears        Past Surgical History:   Procedure Laterality Date    COLONOSCOPY  2010    polyps    COLONOSCOPY  08/01/2016    Diverticulosis    TOE SURGERY Bilateral 2011    toe nails Big toes removed    UPPER GASTROINTESTINAL ENDOSCOPY  4/9/12    lema's esophagus    UPPER GASTROINTESTINAL ENDOSCOPY  2018    GERD-3 year follow up   [address]  2014    lema's esophagus         Family History   Problem Relation Age of Onset    Heart Disease Father     Heart Attack Father     High Cholesterol Father     Mult Sclerosis Sister     Other Brother         [facility] Fever       CareTeam (Including outside providers/suppliers regularly involved in providing care):   Patient Care Team:  JAY JAY Torres CNP as PCP - General (Nurse Practitioner)  JAY JAY Torres CNP as PCP - [facility] Empaneled Provider  Mary Black DO as Consulting Physician (Orthopedic Surgery)    Wt Readings from Last 3 Encounters:   11/20/20 220 lb 9.6 oz (100.1 kg)   02/21/20 204 lb 9.6 oz (92.8 kg)   11/20/19 206 lb 3.2 oz (93.5 kg)     Patient-Reported Vitals 5/20/2021   Patient-Reported Weight -   Patient-Reported Systolic 004   Patient-Reported Diastolic 82   Patient-Reported Pulse 90   Patient-Reported Temperature 97.1   Patient-Reported SpO2 96      There is no height or weight on file to calculate BMI. Based upon direct observation of the patient, evaluation of cognition reveals recent and remote memory intact. Alert and oriented. Resp E&E.  ROM intact. Patient's complete Health Risk Assessment and screening values have been reviewed and are found in Flowsheets. The following problems were reviewed today and where indicated follow up appointments were made and/or referrals ordered. Positive Risk Factor Screenings with Interventions:               No Positive Risk Factors identified today.     Personalized Preventive Plan   Current Health Maintenance Status  Immunization History   Administered Date(s) Administered    Influenza A ([id number]) Vaccine PF IM 12/21/2009    Influenza Vaccine, unspecified formulation 09/13/2015, 08/20/2016, 09/25/2017    Influenza, High Dose (Fluzone 65 yrs and older) 09/13/2015, 08/15/2016, 09/25/2017, 08/01/2018, 09/18/2019, 08/14/2020    Influenza, Quadv, adjuvanted, 65 yrs +, IM, PF (Fluad) 08/14/2020    Pneumococcal Conjugate 13-valent ([id number]) 11/09/2015    Pneumococcal Polysaccharide ([id number]) 11/01/2012    Tdap (Boostrix, Adacel) 11/21/2013    Zoster Live (Zostavax) 07/11/2013    Zoster Recombinant (Shingrix) 06/18/2020, 11/16/2020        Health Maintenance   Topic Date Due    Diabetic retinal exam  Never done    COVID-19 Vaccine (1) Never done   [employer] Visit (AWV)  Never done    Diabetic foot exam  11/20/2021    A1C test (Diabetic or Prediabetic)  11/20/2021    Diabetic microalbuminuria test  11/20/2021    Lipid screen  11/20/2021    Potassium monitoring  11/20/2021    Creatinine monitoring  11/20/2021    DTaP/Tdap/Td vaccine (2 - Td) 11/21/2023    Colon cancer screen colonoscopy  12/27/2028    Flu vaccine  Completed    Shingles Vaccine  Completed    Pneumococcal 65+ years Vaccine  Completed    Hepatitis C screen  Completed    Hepatitis A vaccine  Aged Out    Hib vaccine  Aged Out    Meningococcal (ACWY) vaccine  Aged Out     Recommendations for [employer] Due: see orders and patient instructions/AVS.  . Recommended screening schedule for the next 5-10 years is provided to the patient in written form: see Patient Instructions/AVS.    Kenzie Clifton was seen today for hypertension, hyperlipidemia, medicare awv and other.     Diagnoses and all orders for this visit:    Aneurysm of ascending aorta ([facility] [address]    Type 2 diabetes mellitus without complication, without long-term current use of insulin ([address]    Routine general medical examination at a

## 2021-05-20 NOTE — PATIENT INSTRUCTIONS
Personalized Preventive Plan for Nilson Grullon - 5/20/2021  Medicare offers a range of preventive health benefits. Some of the tests and screenings are paid in full while other may be subject to a deductible, co-insurance, and/or copay. Some of these benefits include a comprehensive review of your medical history including lifestyle, illnesses that may run in your family, and various assessments and screenings as appropriate. After reviewing your medical record and screening and assessments performed today your provider may have ordered immunizations, labs, imaging, and/or referrals for you. A list of these orders (if applicable) as well as your Preventive Care list are included within your After Visit Summary for your review. Other Preventive Recommendations:    · A preventive eye exam performed by an eye specialist is recommended every 1-2 years to screen for glaucoma; cataracts, macular degeneration, and other eye disorders. · A preventive dental visit is recommended every 6 months. · Try to get at least 150 minutes of exercise per week or 10,000 steps per day on a pedometer . · Order or download the FREE \"Exercise & Physical Activity: Your Everyday Guide\" from [employer]. Call 4-704.890.4925 or search [employer] online. · You need 1771-5042 mg of calcium and 8261-0137 IU of vitamin D per day. It is possible to meet your calcium requirement with diet alone, but a vitamin D supplement is usually necessary to meet this goal.  · When exposed to the sun, use a sunscreen that protects against both UVA and UVB radiation with an SPF of 30 or greater. Reapply every 2 to 3 hours or after sweating, drying off with a towel, or swimming. · Always wear a seat belt when traveling in a car. Always wear a helmet when riding a bicycle or motorcycle.

## 2021-07-19 ENCOUNTER — OFFICE VISIT (OUTPATIENT)
Dept: FAMILY MEDICINE CLINIC | Age: 76
End: 2021-07-19
Payer: COMMERCIAL

## 2021-07-19 VITALS
WEIGHT: 233 LBS | HEART RATE: 96 BPM | BODY MASS INDEX: 31.6 KG/M2 | OXYGEN SATURATION: 97 % | DIASTOLIC BLOOD PRESSURE: 72 MMHG | SYSTOLIC BLOOD PRESSURE: 132 MMHG

## 2021-07-19 DIAGNOSIS — R06.6 SPASM OF DIAPHRAGM: Primary | ICD-10-CM

## 2021-07-19 PROCEDURE — 99213 OFFICE O/P EST LOW 20 MIN: CPT | Performed by: STUDENT IN AN ORGANIZED HEALTH CARE EDUCATION/TRAINING PROGRAM

## 2021-07-19 SDOH — ECONOMIC STABILITY: FOOD INSECURITY: WITHIN THE PAST 12 MONTHS, THE FOOD YOU BOUGHT JUST DIDN'T LAST AND YOU DIDN'T HAVE MONEY TO GET MORE.: NEVER TRUE

## 2021-07-19 SDOH — ECONOMIC STABILITY: TRANSPORTATION INSECURITY
IN THE PAST 12 MONTHS, HAS LACK OF TRANSPORTATION KEPT YOU FROM MEETINGS, WORK, OR FROM GETTING THINGS NEEDED FOR DAILY LIVING?: NO

## 2021-07-19 SDOH — ECONOMIC STABILITY: FOOD INSECURITY: WITHIN THE PAST 12 MONTHS, YOU WORRIED THAT YOUR FOOD WOULD RUN OUT BEFORE YOU GOT MONEY TO BUY MORE.: NEVER TRUE

## 2021-07-19 SDOH — ECONOMIC STABILITY: TRANSPORTATION INSECURITY
IN THE PAST 12 MONTHS, HAS THE LACK OF TRANSPORTATION KEPT YOU FROM MEDICAL APPOINTMENTS OR FROM GETTING MEDICATIONS?: NO

## 2021-07-19 ASSESSMENT — SOCIAL DETERMINANTS OF HEALTH (SDOH): HOW HARD IS IT FOR YOU TO PAY FOR THE VERY BASICS LIKE FOOD, HOUSING, MEDICAL CARE, AND HEATING?: NOT HARD AT ALL

## 2021-07-19 NOTE — PROGRESS NOTES
Patient: Nohelia Nguyễn is a 70 y.o. male who presents today with the following Chief Complaint(s):  Chief Complaint   Patient presents with    Abdominal Pain     he sneezed very hard on wednesday and the cramps and spasms started horribly. this happend again after a cough on thursday and on friday after passing gas. has tried several different things including the muscle relaxers perscribed by [facility]. Patient mentions that it happens mostly at random and his whole abdomine and side will feel siezed up. HPI     Has had spasms in lower chest worsened for months. Feels last week noticed that this occurred when sneezing, passing gas, or coughing. Describes pain as spasm that lasts 15-20 minutes. Would eventually go away with ice or rubbing. Typically occurs on right side but can happen on left as well. Initiates towards sternum then can radiate out to sides. No breathing difficulty when this occurs but does feel some chest tightness. Has been present for years. Has seen GI who felt this was secondary to gas buildup. Has also been off cholesterol medication to see if this helps. Can occur 3-4 times a week or go a month without occuring    Current Outpatient Medications   Medication Sig Dispense Refill    esomeprazole (NEXIUM) 40 MG delayed release capsule Take 1 capsule by mouth every morning (before breakfast) 30 capsule 5    NIFEdipine (ADALAT CC) 90 MG extended release tablet TAKE 1 TABLET BY MOUTH EVERY DAY 90 tablet 3    triamcinolone (KENALOG) 0.1 % cream APPLY SMALL AMOUNT 2 TIMES DAILY 15 g 2    losartan-hydroCHLOROthiazide (HYZAAR) 50-12.5 MG per tablet TAKE 1 TABLET BY MOUTH EVERY DAY 90 tablet 3    Zinc Sulfate (ZINC 15 PO) Take 15 mg by mouth daily      Menaquinone-7 (VITAMIN K2 PO) Take 800 mcg by mouth daily      Boswellia Ela (BOSWELLIA PO) Take 277 mg by mouth daily      Psyllium (METAMUCIL FIBER PO) Take by mouth daily 1 teaspoon daily.       UNABLE TO FIND CBD oil sub liguial  Handicap Placard MISC by Does not apply route 1 each 0    Multiple Vitamins-Minerals (THERAPEUTIC MULTIVITAMIN-MINERALS) tablet Take 1 tablet by mouth daily      ibuprofen (ADVIL;MOTRIN) 200 MG tablet Take 200 mg by mouth every 6 hours as needed for Pain      Omega-3 Fatty Acids (FISH OIL) 600 MG CAPS Take by mouth      vitamin C (ASCORBIC ACID) 500 MG tablet Take 500 mg by mouth daily      magnesium (MAGNESIUM-OXIDE) 250 MG TABS tablet Take 250 mg by mouth daily      Calcium Carb-Cholecalciferol (CALCIUM/VITAMIN D) 600-400 MG-UNIT TABS Take by mouth      TURMERIC PO Take 2,700 mg by mouth daily       Probiotic Product (SOLUBLE FIBER/PROBIOTICS PO) Take 1 capsule by mouth daily.  fexofenadine (ALLEGRA ALLERGY) 180 MG tablet Take 180 mg by mouth daily.  POTASSIUM GLUCONATE Take 1 tablet by mouth daily.  Multiple Vitamins-Minerals (VISION VITAMINS PO) Take 1 capsule by mouth daily.  camphor-menthol (SARNA) 0.5-0.5 % lotion Apply topically as needed for Itching [facility] (Patient not taking: Reported on 5/20/2021)       No current facility-administered medications for this visit. Patient's past medical history, surgical history, family history, medications,  andallergies  were all reviewed and updated as appropriate today. Review of Systems  All other systems reviewed and negative    Physical Exam  Vitals reviewed. Constitutional:       Appearance: Normal appearance. HENT:      Head: Normocephalic and atraumatic. Cardiovascular:      Rate and Rhythm: Normal rate and regular rhythm. Pulmonary:      Effort: Pulmonary effort is normal.      Breath sounds: Normal breath sounds. Musculoskeletal:      Comments: No tenderness with palpation of sternum, Ribs, or upper abdomen. Pain exacerbated with forward bending   Neurological:      General: No focal deficit present. Mental Status: He is alert and oriented to person, place, and time.    Psychiatric: Behavior: Behavior normal.         Thought Content: Thought content normal.       Vitals:    07/19/21 1318   BP: 132/72   Pulse: 96   SpO2: 97%       Assessment:  Encounter Diagnosis   Name Primary?  Spasm of diaphragm Yes       Plan:  1. Spasm of diaphragm  Description of symptoms consistent with musculoskeletal strain. Given location of occurrence could be spasm of diaphragm. Patient does not report any associated shortness of breath. No tenderness on exam today. Exacerbated with forward bending. Patient with significant limited mobility of rib cage and spine which may be contributing to recurrent episodes. Discussed recommendation of physical therapy however patient does not wish to pursue at this time. Recommend continued follow-up with [facility]. No follow-ups on file.

## 2021-08-11 ENCOUNTER — HOSPITAL ENCOUNTER (OUTPATIENT)
Age: 76
Discharge: HOME | End: 2021-08-11
Payer: COMMERCIAL

## 2021-08-11 LAB
INR BLD: 1.04 (ref 0.88–1.12)
PROTHROMBIN TIME: 11.8 SEC (ref 9.9–12.7)

## 2021-08-11 PROCEDURE — 85610 PROTHROMBIN TIME: CPT

## 2021-08-11 PROCEDURE — 36415 COLL VENOUS BLD VENIPUNCTURE: CPT

## 2021-08-11 PROCEDURE — 83735 ASSAY OF MAGNESIUM: CPT

## 2021-08-11 PROCEDURE — 85025 COMPLETE CBC W/AUTO DIFF WBC: CPT

## 2021-08-11 PROCEDURE — 80053 COMPREHEN METABOLIC PANEL: CPT

## 2021-08-11 PROCEDURE — 82306 VITAMIN D 25 HYDROXY: CPT

## 2021-08-11 PROCEDURE — 83690 ASSAY OF LIPASE: CPT

## 2021-08-12 LAB
A/G RATIO: 1.2 (ref 1.1–2.2)
ALBUMIN SERPL-MCNC: 4.1 G/DL (ref 3.4–5)
ALP BLD-CCNC: 70 U/L (ref 40–129)
ALT SERPL-CCNC: 23 U/L (ref 10–40)
ANION GAP SERPL CALCULATED.3IONS-SCNC: 13 MMOL/L (ref 3–16)
AST SERPL-CCNC: 19 U/L (ref 15–37)
BASOPHILS ABSOLUTE: 0.1 K/UL (ref 0–0.2)
BASOPHILS RELATIVE PERCENT: 1.3 %
BILIRUB SERPL-MCNC: 0.3 MG/DL (ref 0–1)
BUN BLDV-MCNC: 17 MG/DL (ref 7–20)
CALCIUM SERPL-MCNC: 9.3 MG/DL (ref 8.3–10.6)
CHLORIDE BLD-SCNC: 99 MMOL/L (ref 99–110)
CO2: 23 MMOL/L (ref 21–32)
CREAT SERPL-MCNC: 1.3 MG/DL (ref 0.8–1.3)
EOSINOPHILS ABSOLUTE: 0.2 K/UL (ref 0–0.6)
EOSINOPHILS RELATIVE PERCENT: 1.9 %
GFR AFRICAN AMERICAN: >60
GFR NON-AFRICAN AMERICAN: 54
GLOBULIN: 3.4 G/DL
GLUCOSE BLD-MCNC: 103 MG/DL (ref 70–99)
HCT VFR BLD CALC: 34.3 % (ref 40.5–52.5)
HEMOGLOBIN: 11 G/DL (ref 13.5–17.5)
LIPASE: 35 U/L (ref 13–60)
LYMPHOCYTES ABSOLUTE: 2.5 K/UL (ref 1–5.1)
LYMPHOCYTES RELATIVE PERCENT: 21.5 %
MAGNESIUM: 2.1 MG/DL (ref 1.8–2.4)
MCH RBC QN AUTO: 22.8 PG (ref 26–34)
MCHC RBC AUTO-ENTMCNC: 32.1 G/DL (ref 31–36)
MCV RBC AUTO: 71.1 FL (ref 80–100)
MONOCYTES ABSOLUTE: 1.4 K/UL (ref 0–1.3)
MONOCYTES RELATIVE PERCENT: 12.3 %
NEUTROPHILS ABSOLUTE: 7.3 K/UL (ref 1.7–7.7)
NEUTROPHILS RELATIVE PERCENT: 63 %
PDW BLD-RTO: 18 % (ref 12.4–15.4)
PLATELET # BLD: 451 K/UL (ref 135–450)
PMV BLD AUTO: 7.9 FL (ref 5–10.5)
POTASSIUM SERPL-SCNC: 3.8 MMOL/L (ref 3.5–5.1)
RBC # BLD: 4.83 M/UL (ref 4.2–5.9)
SODIUM BLD-SCNC: 135 MMOL/L (ref 136–145)
TOTAL PROTEIN: 7.5 G/DL (ref 6.4–8.2)
VITAMIN D 25-HYDROXY: 52.4 NG/ML
WBC # BLD: 11.6 K/UL (ref 4–11)

## 2021-12-02 ENCOUNTER — OFFICE VISIT (OUTPATIENT)
Dept: FAMILY MEDICINE CLINIC | Age: 76
End: 2021-12-02
Payer: COMMERCIAL

## 2021-12-02 VITALS
OXYGEN SATURATION: 96 % | HEART RATE: 90 BPM | WEIGHT: 230 LBS | HEIGHT: 72 IN | RESPIRATION RATE: 16 BRPM | SYSTOLIC BLOOD PRESSURE: 122 MMHG | BODY MASS INDEX: 31.15 KG/M2 | DIASTOLIC BLOOD PRESSURE: 78 MMHG

## 2021-12-02 DIAGNOSIS — Z12.5 PROSTATE CANCER SCREENING: ICD-10-CM

## 2021-12-02 DIAGNOSIS — E11.9 TYPE 2 DIABETES MELLITUS WITHOUT COMPLICATION, WITHOUT LONG-TERM CURRENT USE OF INSULIN (HCC): Primary | ICD-10-CM

## 2021-12-02 DIAGNOSIS — M17.11 PRIMARY OSTEOARTHRITIS OF RIGHT KNEE: ICD-10-CM

## 2021-12-02 DIAGNOSIS — D50.9 IRON DEFICIENCY ANEMIA, UNSPECIFIED IRON DEFICIENCY ANEMIA TYPE: ICD-10-CM

## 2021-12-02 DIAGNOSIS — E78.2 MIXED HYPERLIPIDEMIA: ICD-10-CM

## 2021-12-02 DIAGNOSIS — K22.70 BARRETT'S ESOPHAGUS WITHOUT DYSPLASIA: ICD-10-CM

## 2021-12-02 DIAGNOSIS — I10 ESSENTIAL HYPERTENSION: ICD-10-CM

## 2021-12-02 LAB
BASOPHILS ABSOLUTE: 0.1 K/UL (ref 0–0.2)
BASOPHILS RELATIVE PERCENT: 0.8 %
CHOLESTEROL, TOTAL: 196 MG/DL (ref 0–199)
CREATININE URINE POCT: 300
EOSINOPHILS ABSOLUTE: 0.2 K/UL (ref 0–0.6)
EOSINOPHILS RELATIVE PERCENT: 1.9 %
FOLATE: 11.12 NG/ML (ref 4.78–24.2)
HBA1C MFR BLD: 6 %
HCT VFR BLD CALC: 48.9 % (ref 40.5–52.5)
HDLC SERPL-MCNC: 60 MG/DL (ref 40–60)
HEMOGLOBIN: 15.6 G/DL (ref 13.5–17.5)
IRON SATURATION: 24 % (ref 20–50)
IRON: 85 UG/DL (ref 59–158)
LDL CHOLESTEROL CALCULATED: 119 MG/DL
LYMPHOCYTES ABSOLUTE: 2.1 K/UL (ref 1–5.1)
LYMPHOCYTES RELATIVE PERCENT: 24.4 %
MCH RBC QN AUTO: 28.4 PG (ref 26–34)
MCHC RBC AUTO-ENTMCNC: 31.8 G/DL (ref 31–36)
MCV RBC AUTO: 89.1 FL (ref 80–100)
MICROALBUMIN/CREAT 24H UR: 80 MG/G{CREAT}
MICROALBUMIN/CREAT UR-RTO: <30
MONOCYTES ABSOLUTE: 1 K/UL (ref 0–1.3)
MONOCYTES RELATIVE PERCENT: 11.2 %
NEUTROPHILS ABSOLUTE: 5.3 K/UL (ref 1.7–7.7)
NEUTROPHILS RELATIVE PERCENT: 61.7 %
PDW BLD-RTO: 15.8 % (ref 12.4–15.4)
PLATELET # BLD: 337 K/UL (ref 135–450)
PMV BLD AUTO: 8.1 FL (ref 5–10.5)
PROSTATE SPECIFIC ANTIGEN: 1.14 NG/ML (ref 0–4)
RBC # BLD: 5.48 M/UL (ref 4.2–5.9)
TOTAL IRON BINDING CAPACITY: 352 UG/DL (ref 260–445)
TRIGL SERPL-MCNC: 87 MG/DL (ref 0–150)
VITAMIN B-12: 462 PG/ML (ref 211–911)
VLDLC SERPL CALC-MCNC: 17 MG/DL
WBC # BLD: 8.5 K/UL (ref 4–11)

## 2021-12-02 PROCEDURE — 99214 OFFICE O/P EST MOD 30 MIN: CPT | Performed by: NURSE PRACTITIONER

## 2021-12-02 PROCEDURE — 82044 UR ALBUMIN SEMIQUANTITATIVE: CPT | Performed by: NURSE PRACTITIONER

## 2021-12-02 PROCEDURE — 83036 HEMOGLOBIN GLYCOSYLATED A1C: CPT | Performed by: NURSE PRACTITIONER

## 2021-12-02 RX ORDER — FERROUS SULFATE 325(65) MG
325 TABLET ORAL
COMMUNITY

## 2021-12-02 RX ORDER — ESOMEPRAZOLE MAGNESIUM 40 MG/1
CAPSULE, DELAYED RELEASE ORAL
Qty: 90 CAPSULE | Refills: 3 | Status: SHIPPED | OUTPATIENT
Start: 2021-12-02 | End: 2022-06-02

## 2021-12-02 NOTE — PROGRESS NOTES
[facility] Exam.  [facility] near [address] in [facility] dental exam recently done due to chipped tooth & crown placed.

## 2021-12-02 NOTE — PROGRESS NOTES
Ruba Henson (:  1945) is a 78 y.o. male,Established patient, here for evaluation of the following chief complaint(s):  Diabetes         ASSESSMENT/PLAN:  1. Type 2 diabetes mellitus without complication, without long-term current use of insulin ([facility])  -     POCT glycosylated hemoglobin (Hb A1C)  -      DIABETES FOOT EXAM  -     POCT microalbumin  2. Prostate cancer screening  -     PSA screening  3. Mixed hyperlipidemia  -     Lipid Panel  4. Essential hypertension  5. Iron deficiency anemia, unspecified iron deficiency anemia type  -     CBC Auto Differential  -     Iron and TIBC  -     Vitamin B12 & Folate  6. Primary osteoarthritis of right knee  -     Handicap Placard MISC; Starting Thu 2021, Disp-1 each, R-0, PrintPlease issue for duration of 5 years. 7. 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 Continue routine activity    Recommend covid booster. Return in about 6 months (around 2022) for hyperlipidemia, HTN. Subjective   SUBJECTIVE/OBJECTIVE:  HPI     For routine follow up diabetes. Continues to exercise at home. Weight down 3 pounds over past 6 months. Had drop in red blood cells. Seen by GI and was advised to start iron supplement. Review of Systems   All other systems reviewed and are negative. Objective   Physical Exam  Constitutional:       Appearance: Normal appearance. Cardiovascular:      Rate and Rhythm: Normal rate. Heart sounds: Normal heart sounds. Pulmonary:      Effort: Pulmonary effort is normal.   Musculoskeletal:         General: No swelling.    Neurological:      Mental Status: He is alert and oriented to person, place, and time.    Psychiatric:         Behavior: Behavior normal.                  An electronic signature was used to authenticate this note.    --JAY JAY MARSHALL - CNP

## 2022-05-13 DIAGNOSIS — I10 ESSENTIAL HYPERTENSION: ICD-10-CM

## 2022-05-13 RX ORDER — NIFEDIPINE 90 MG/1
TABLET, FILM COATED, EXTENDED RELEASE ORAL
Qty: 90 TABLET | Refills: 3 | Status: SHIPPED | OUTPATIENT
Start: 2022-05-13

## 2022-05-13 NOTE — TELEPHONE ENCOUNTER
Refill Request     CONFIRM preferrred pharmacy with the patient. If Mail Order Rx - Pend for 90 day refill.       Last Seen: Last Seen Department: 12/2/2021  Last Seen by PCP: 12/2/2021    Last Written: 5/17/2021    Next Appointment:   Future Appointments   Date Time Provider Jeremy Joshi   6/2/2022  8:00 AM JAY JAY Cruz - CNP [facility]  [place] - [place]       Future appointment scheduled      Requested Prescriptions     Pending Prescriptions Disp Refills    NIFEdipine (ADALAT CC) 90 MG extended release tablet [Pharmacy Med Name: NIFEDIPINE ER 90 MG TABLET] 90 tablet 3     Sig: TAKE 1 TABLET BY MOUTH EVERY DAY

## 2022-06-02 ENCOUNTER — OFFICE VISIT (OUTPATIENT)
Dept: FAMILY MEDICINE CLINIC | Age: 77
End: 2022-06-02
Payer: COMMERCIAL

## 2022-06-02 VITALS
DIASTOLIC BLOOD PRESSURE: 64 MMHG | BODY MASS INDEX: 32.2 KG/M2 | SYSTOLIC BLOOD PRESSURE: 102 MMHG | OXYGEN SATURATION: 93 % | HEART RATE: 90 BPM | WEIGHT: 237.4 LBS | RESPIRATION RATE: 18 BRPM

## 2022-06-02 DIAGNOSIS — E11.29 MICROALBUMINURIA DUE TO TYPE 2 DIABETES MELLITUS (HCC): ICD-10-CM

## 2022-06-02 DIAGNOSIS — M17.11 PRIMARY OSTEOARTHRITIS OF RIGHT KNEE: Primary | ICD-10-CM

## 2022-06-02 DIAGNOSIS — I71.21 ANEURYSM OF ASCENDING AORTA: ICD-10-CM

## 2022-06-02 DIAGNOSIS — E11.9 TYPE 2 DIABETES MELLITUS WITHOUT COMPLICATION, WITHOUT LONG-TERM CURRENT USE OF INSULIN (HCC): ICD-10-CM

## 2022-06-02 DIAGNOSIS — R73.01 IFG (IMPAIRED FASTING GLUCOSE): ICD-10-CM

## 2022-06-02 DIAGNOSIS — R80.9 MICROALBUMINURIA DUE TO TYPE 2 DIABETES MELLITUS (HCC): ICD-10-CM

## 2022-06-02 DIAGNOSIS — N18.31 STAGE 3A CHRONIC KIDNEY DISEASE (HCC): ICD-10-CM

## 2022-06-02 PROBLEM — N18.30 CHRONIC RENAL DISEASE, STAGE III (HCC): Status: ACTIVE | Noted: 2022-06-02

## 2022-06-02 LAB
A/G RATIO: 1.6 (ref 1.1–2.2)
ALBUMIN SERPL-MCNC: 4.5 G/DL (ref 3.4–5)
ALP BLD-CCNC: 74 U/L (ref 40–129)
ALT SERPL-CCNC: 28 U/L (ref 10–40)
ANION GAP SERPL CALCULATED.3IONS-SCNC: 17 MMOL/L (ref 3–16)
AST SERPL-CCNC: 21 U/L (ref 15–37)
BILIRUB SERPL-MCNC: 0.6 MG/DL (ref 0–1)
BUN BLDV-MCNC: 17 MG/DL (ref 7–20)
CALCIUM SERPL-MCNC: 9.6 MG/DL (ref 8.3–10.6)
CHLORIDE BLD-SCNC: 103 MMOL/L (ref 99–110)
CO2: 23 MMOL/L (ref 21–32)
CREAT SERPL-MCNC: 1.1 MG/DL (ref 0.8–1.3)
CREATININE URINE POCT: 300
GFR AFRICAN AMERICAN: >60
GFR NON-AFRICAN AMERICAN: >60
GLUCOSE BLD-MCNC: 123 MG/DL (ref 70–99)
MICROALBUMIN/CREAT 24H UR: 30 MG/G{CREAT}
MICROALBUMIN/CREAT UR-RTO: <30
POTASSIUM REFLEX MAGNESIUM: 4.4 MMOL/L (ref 3.5–5.1)
SODIUM BLD-SCNC: 143 MMOL/L (ref 136–145)
TOTAL PROTEIN: 7.4 G/DL (ref 6.4–8.2)

## 2022-06-02 PROCEDURE — 1123F ACP DISCUSS/DSCN MKR DOCD: CPT | Performed by: NURSE PRACTITIONER

## 2022-06-02 PROCEDURE — 99214 OFFICE O/P EST MOD 30 MIN: CPT | Performed by: NURSE PRACTITIONER

## 2022-06-02 PROCEDURE — 82044 UR ALBUMIN SEMIQUANTITATIVE: CPT | Performed by: NURSE PRACTITIONER

## 2022-06-02 ASSESSMENT — PATIENT HEALTH QUESTIONNAIRE - PHQ9
SUM OF ALL RESPONSES TO PHQ9 QUESTIONS 1 & 2: 0
1. LITTLE INTEREST OR PLEASURE IN DOING THINGS: 0
SUM OF ALL RESPONSES TO PHQ QUESTIONS 1-9: 0
2. FEELING DOWN, DEPRESSED OR HOPELESS: 0

## 2022-06-02 NOTE — PROGRESS NOTES
Abdoulaye Espinoza (:  1945) is a 78 y.o. male,Established patient, here for evaluation of the following chief complaint(s):  Hyperlipidemia and Hypertension         ASSESSMENT/PLAN:  1. Primary osteoarthritis of right knee  -     Handicap Placard MISC; Starting Thu 2022, Disp-1 each, R-0, PrintPlease issue for duration of 5 years. 2. Stage 3a chronic kidney disease (HCC)  -     Comprehensive Metabolic Panel w/ Reflex to MG  3. Aneurysm of ascending aorta (HCC)  -     ECHO 2D WO Color Doppler Complete; Future  4. Type 2 diabetes mellitus without complication, without long-term current use of insulin ([address]  5. IFG (impaired fasting glucose)  6. Microalbuminuria due to type 2 diabetes mellitus ([address]  -     Hemoglobin A1C  -     Comprehensive Metabolic Panel w/ Reflex to MG  -     POCT microalbumin    Reviewed CTabdomen 2018, probable ascending aneurysm. US abdomen neg for aneurysm. Will order echo for follow up evalution. Will let office know what is covered by insurance when refills needed next time    Reviewed labs from , elevation of HgA1c to 6.0. Planning eye exam in July at [facility]. Discussed covid booster # 2. Questions answered. Return in about 6 months (around 2022) for HTN, hyperlipidemia, impaired fasting glucose. Subjective   SUBJECTIVE/OBJECTIVE:  HPI     For routine follow up HTN, hyperlipidemia. Dr. Jude Shabazz started protonix related to cost of nexium. Cost of nifedipine has increased. Would like to change to something else. Review of Systems   All other systems reviewed and are negative. Objective   Physical Exam  Constitutional:       Appearance: Normal appearance. Cardiovascular:      Rate and Rhythm: Normal rate. Pulses: Normal pulses. Pulmonary:      Effort: Pulmonary effort is normal.   Musculoskeletal:         General: Normal range of motion.    Neurological:      Mental Status: He is alert and oriented to person, place, and time.    Psychiatric:         Behavior: Behavior normal.                  An electronic signature was used to authenticate this note.    --JAY JAY MARSHALL - CNP

## 2022-06-03 ENCOUNTER — TELEPHONE (OUTPATIENT)
Dept: FAMILY MEDICINE CLINIC | Age: 77
End: 2022-06-03

## 2022-06-03 DIAGNOSIS — R06.02 SOB (SHORTNESS OF BREATH): ICD-10-CM

## 2022-06-03 DIAGNOSIS — I71.9 AORTIC ANEURYSM WITHOUT RUPTURE, UNSPECIFIED PORTION OF AORTA (HCC): Primary | ICD-10-CM

## 2022-06-03 PROBLEM — N18.30 CHRONIC RENAL DISEASE, STAGE III (HCC): Status: RESOLVED | Noted: 2022-06-02 | Resolved: 2022-06-03

## 2022-06-03 LAB
ESTIMATED AVERAGE GLUCOSE: 125.5 MG/DL
HBA1C MFR BLD: 6 %

## 2022-06-03 NOTE — TELEPHONE ENCOUNTER
Central scheduling called wanting to make sure the order for the echo was correct without color. Please advise.

## 2022-06-03 NOTE — TELEPHONE ENCOUNTER
Called central scheduleding and informed them that this has been changed. They will reach out to the patient to get scheduled.

## 2022-06-15 ENCOUNTER — HOSPITAL ENCOUNTER (OUTPATIENT)
Dept: NON INVASIVE DIAGNOSTICS | Age: 77
Discharge: HOME | End: 2022-06-15
Payer: COMMERCIAL

## 2022-06-15 DIAGNOSIS — R06.02 SOB (SHORTNESS OF BREATH): ICD-10-CM

## 2022-06-15 DIAGNOSIS — I71.9 AORTIC ANEURYSM WITHOUT RUPTURE, UNSPECIFIED PORTION OF AORTA (HCC): ICD-10-CM

## 2022-06-15 LAB
LV EF: 55 %
LVEF MODALITY: NORMAL

## 2022-06-15 PROCEDURE — 93306 TTE W/DOPPLER COMPLETE: CPT

## 2022-08-08 DIAGNOSIS — M54.50 ACUTE MIDLINE LOW BACK PAIN WITHOUT SCIATICA: ICD-10-CM

## 2022-08-08 NOTE — TELEPHONE ENCOUNTER
This medication is not on Billy's current medication list.     Refill Request     CONFIRM preferrred pharmacy with the patient. If Mail Order Rx - Pend for 90 day refill.       Last Seen: Last Seen Department: 6/2/2022  Last Seen by PCP: 6/2/2022    Last Written: 11/17/2021, #30, 1 refill    Next Appointment:   Future Appointments   Date Time Provider Jeremy Joshi   12/2/2022  8:00 AM JAY JAY Benitez - CNP [facility] [place] [facility] [place]       Future appointment scheduled      Requested Prescriptions     Pending Prescriptions Disp Refills    methocarbamol (ROBAXIN) 750 MG tablet [Pharmacy Med Name: METHOCARBAMOL 750 MG TABLET] 30 tablet 1     Sig: TAKE 1 TABLET BY MOUTH 3 TIMES DAILY AS NEEDED (BACK PAIN)

## 2022-08-09 RX ORDER — METHOCARBAMOL 750 MG/1
750 TABLET, FILM COATED ORAL 3 TIMES DAILY PRN
Qty: 30 TABLET | Refills: 1 | Status: SHIPPED | OUTPATIENT
Start: 2022-08-09 | End: 2022-08-19

## 2022-11-02 DIAGNOSIS — E78.2 MIXED HYPERLIPIDEMIA: ICD-10-CM

## 2022-11-02 RX ORDER — LOSARTAN POTASSIUM AND HYDROCHLOROTHIAZIDE 12.5; 5 MG/1; MG/1
TABLET ORAL
Qty: 90 TABLET | Refills: 3 | Status: SHIPPED | OUTPATIENT
Start: 2022-11-02

## 2022-11-02 NOTE — TELEPHONE ENCOUNTER
Refill Request     CONFIRM preferrred pharmacy with the patient. If Mail Order Rx - Pend for 90 day refill. Last Seen: Last Seen Department: 6/2/2022  Last Seen by PCP: 6/2/2022    Last Written: 10/19/2021 90 tablet 3 refills     If no future appointment scheduled, route STAFF MESSAGE with patient name to the [facility] for scheduling. Next Appointment:   Future Appointments   Date Time Provider Jeremy Joshi   12/2/2022  8:00 AM JAY JAY Cruz - CNP [facility]  [place] - [place]       Message sent to [address] [place] [address] to schedule appt with patient?   NO      Requested Prescriptions     Pending Prescriptions Disp Refills    losartan-hydroCHLOROthiazide (HYZAAR) 50-12.5 MG per tablet [Pharmacy Med Name: LOSARTAN-HCTZ 50-12.5 MG TAB] 90 tablet 3     Sig: TAKE 1 TABLET BY MOUTH EVERY DAY

## 2022-11-29 SDOH — HEALTH STABILITY: PHYSICAL HEALTH: ON AVERAGE, HOW MANY MINUTES DO YOU ENGAGE IN EXERCISE AT THIS LEVEL?: 10 MIN

## 2022-11-29 SDOH — HEALTH STABILITY: PHYSICAL HEALTH: ON AVERAGE, HOW MANY DAYS PER WEEK DO YOU ENGAGE IN MODERATE TO STRENUOUS EXERCISE (LIKE A BRISK WALK)?: 5 DAYS

## 2022-11-29 ASSESSMENT — LIFESTYLE VARIABLES
HOW MANY STANDARD DRINKS CONTAINING ALCOHOL DO YOU HAVE ON A TYPICAL DAY: 1 OR 2
HOW OFTEN DO YOU HAVE A DRINK CONTAINING ALCOHOL: 4
HOW OFTEN DO YOU HAVE SIX OR MORE DRINKS ON ONE OCCASION: 1
HOW MANY STANDARD DRINKS CONTAINING ALCOHOL DO YOU HAVE ON A TYPICAL DAY: 1
HOW OFTEN DO YOU HAVE A DRINK CONTAINING ALCOHOL: 2-3 TIMES A WEEK

## 2022-12-02 ENCOUNTER — OFFICE VISIT (OUTPATIENT)
Dept: FAMILY MEDICINE CLINIC | Age: 77
End: 2022-12-02

## 2022-12-02 VITALS
OXYGEN SATURATION: 92 % | SYSTOLIC BLOOD PRESSURE: 122 MMHG | RESPIRATION RATE: 18 BRPM | DIASTOLIC BLOOD PRESSURE: 78 MMHG | HEIGHT: 70 IN | BODY MASS INDEX: 33.33 KG/M2 | HEART RATE: 97 BPM | WEIGHT: 232.8 LBS

## 2022-12-02 DIAGNOSIS — M54.50 ACUTE MIDLINE LOW BACK PAIN WITHOUT SCIATICA: ICD-10-CM

## 2022-12-02 DIAGNOSIS — Z00.00 MEDICARE ANNUAL WELLNESS VISIT, SUBSEQUENT: ICD-10-CM

## 2022-12-02 DIAGNOSIS — E78.5 HYPERLIPIDEMIA DUE TO TYPE 2 DIABETES MELLITUS (HCC): ICD-10-CM

## 2022-12-02 DIAGNOSIS — Z12.5 PROSTATE CANCER SCREENING: ICD-10-CM

## 2022-12-02 DIAGNOSIS — E11.9 TYPE 2 DIABETES MELLITUS WITHOUT COMPLICATION, WITHOUT LONG-TERM CURRENT USE OF INSULIN (HCC): Primary | ICD-10-CM

## 2022-12-02 DIAGNOSIS — E11.69 HYPERLIPIDEMIA DUE TO TYPE 2 DIABETES MELLITUS (HCC): ICD-10-CM

## 2022-12-02 LAB
A/G RATIO: 1.4 (ref 1.1–2.2)
ALBUMIN SERPL-MCNC: 4.6 G/DL (ref 3.4–5)
ALP BLD-CCNC: 71 U/L (ref 40–129)
ALT SERPL-CCNC: 39 U/L (ref 10–40)
ANION GAP SERPL CALCULATED.3IONS-SCNC: 21 MMOL/L (ref 3–16)
AST SERPL-CCNC: 31 U/L (ref 15–37)
BILIRUB SERPL-MCNC: 0.6 MG/DL (ref 0–1)
BUN BLDV-MCNC: 16 MG/DL (ref 7–20)
CALCIUM SERPL-MCNC: 9.8 MG/DL (ref 8.3–10.6)
CHLORIDE BLD-SCNC: 101 MMOL/L (ref 99–110)
CHOLESTEROL, TOTAL: 216 MG/DL (ref 0–199)
CO2: 18 MMOL/L (ref 21–32)
CREAT SERPL-MCNC: 1.2 MG/DL (ref 0.8–1.3)
GFR SERPL CREATININE-BSD FRML MDRD: >60 ML/MIN/{1.73_M2}
GLUCOSE BLD-MCNC: 145 MG/DL (ref 70–99)
HBA1C MFR BLD: 5.9 %
HDLC SERPL-MCNC: 59 MG/DL (ref 40–60)
LDL CHOLESTEROL CALCULATED: 132 MG/DL
POTASSIUM REFLEX MAGNESIUM: 4 MMOL/L (ref 3.5–5.1)
SODIUM BLD-SCNC: 140 MMOL/L (ref 136–145)
TOTAL PROTEIN: 7.9 G/DL (ref 6.4–8.2)
TRIGL SERPL-MCNC: 123 MG/DL (ref 0–150)
VLDLC SERPL CALC-MCNC: 25 MG/DL

## 2022-12-02 RX ORDER — METHOCARBAMOL 750 MG/1
750 TABLET, FILM COATED ORAL 3 TIMES DAILY PRN
Qty: 30 TABLET | Refills: 1 | Status: SHIPPED | OUTPATIENT
Start: 2022-12-02 | End: 2022-12-12

## 2022-12-02 RX ORDER — PANTOPRAZOLE SODIUM 40 MG/1
1 TABLET, DELAYED RELEASE ORAL DAILY
COMMUNITY
Start: 2022-11-18

## 2022-12-02 SDOH — ECONOMIC STABILITY: FOOD INSECURITY: WITHIN THE PAST 12 MONTHS, YOU WORRIED THAT YOUR FOOD WOULD RUN OUT BEFORE YOU GOT MONEY TO BUY MORE.: NEVER TRUE

## 2022-12-02 SDOH — ECONOMIC STABILITY: HOUSING INSECURITY: IN THE LAST 12 MONTHS, HOW MANY PLACES HAVE YOU LIVED?: 1

## 2022-12-02 SDOH — ECONOMIC STABILITY: INCOME INSECURITY: IN THE LAST 12 MONTHS, WAS THERE A TIME WHEN YOU WERE NOT ABLE TO PAY THE MORTGAGE OR RENT ON TIME?: NO

## 2022-12-02 SDOH — ECONOMIC STABILITY: FOOD INSECURITY: WITHIN THE PAST 12 MONTHS, THE FOOD YOU BOUGHT JUST DIDN'T LAST AND YOU DIDN'T HAVE MONEY TO GET MORE.: NEVER TRUE

## 2022-12-02 SDOH — ECONOMIC STABILITY: HOUSING INSECURITY
IN THE LAST 12 MONTHS, WAS THERE A TIME WHEN YOU DID NOT HAVE A STEADY PLACE TO SLEEP OR SLEPT IN A SHELTER (INCLUDING NOW)?: NO

## 2022-12-02 ASSESSMENT — SOCIAL DETERMINANTS OF HEALTH (SDOH): HOW HARD IS IT FOR YOU TO PAY FOR THE VERY BASICS LIKE FOOD, HOUSING, MEDICAL CARE, AND HEATING?: NOT HARD AT ALL

## 2022-12-02 NOTE — PROGRESS NOTES
Medicare Annual Wellness Visit    Luz Marroquin is here for Medicare AWV    Assessment & Plan   Type 2 diabetes mellitus without complication, without long-term current use of insulin (HCC)  -     POCT glycosylated hemoglobin (Hb A1C)  -      DIABETES FOOT EXAM  Medicare annual wellness visit, subsequent  Prostate cancer screening  -     PSA Screening  Hyperlipidemia due to type 2 diabetes mellitus ([facility] [address] [address]  -     Lipid Panel  -     Comprehensive Metabolic Panel w/ Reflex to MG  Acute midline low back pain without sciatica  -     methocarbamol (ROBAXIN) 750 MG tablet; Take 1 tablet by mouth 3 times daily as needed (back pain), Disp-30 tablet, R-1DX Code Needed  . Normal        Hemoglobin A1C   Date Value Ref Range Status   12/02/2022 5.9 % Final         Recommend covid booster    Encouraged to continue diet and activity       Recommendations for Preventive Services Due: see orders and patient instructions/AVS.  Recommended screening schedule for the next 5-10 years is provided to the patient in written form: see Patient Instructions/AVS.     Return in 6 months (on 6/2/2023) for Medicare Annual Wellness Visit in 1 year---6 months htn. Subjective       Continues to have arthritis discomfort in general, No new concerns. Following with Heather WHITTAKER and will see Dr. Brittanie Salas next week. Patient's complete Health Risk Assessment and screening values have been reviewed and are found in Flowsheets. The following problems were reviewed today and where indicated follow up appointments were made and/or referrals ordered.     Positive Risk Factor Screenings with Interventions:              Health Habits/Nutrition:  Physical Activity: Insufficiently Active    Days of Exercise per Week: 5 days    Minutes of Exercise per Session: 10 min     Have you lost any weight without trying in the past 3 months?: No  Body mass index: (!) 33.64  Have you seen the dentist within the past year?: Appointment is scheduled  Health Habits/Nutrition Interventions:  Dental exam overdue:  follows with dentist every 6 months for cleaning             Objective   Vitals:    12/02/22 0754   BP: 122/78   Site: Left Upper Arm   Position: Sitting   Cuff Size: Large Adult   Pulse: 97   Resp: 18   SpO2: 92%   Weight: 232 lb 12.8 oz (105.6 kg)   Height: 5' 9.75\" (1.772 m)      Body mass index is 33.64 kg/m². Allergies   Allergen Reactions    Carafate [Sucralfate] Nausea Only    Cat Hair Extract Other (See Comments)     sneezing    Pravachol [Pravastatin] Other (See Comments)     Myalgia      Vytorin [Ezetimibe-Simvastatin] Other (See Comments)     Muscle cramps       Prior to Visit Medications    Medication Sig Taking? Authorizing Provider   pantoprazole (PROTONIX) 40 MG tablet Take 1 tablet by mouth daily Yes Historical Provider, MD   methocarbamol (ROBAXIN) 750 MG tablet Take 1 tablet by mouth 3 times daily as needed (back pain) Yes JAY JAY Rogers CNP   losartan-hydroCHLOROthiazide (HYZAAR) 50-12.5 MG per tablet TAKE 1 TABLET BY MOUTH EVERY DAY Yes JAY JAY Romero CNP   Handicap Placard MISC by Does not apply route Please issue for duration of 5 years.  Yes JAY JAY Romero CNP   Cyanocobalamin (VITAMIN B 12 PO) Take 1 tablet by mouth daily Yes Historical Provider, MD   NIFEdipine (ADALAT CC) 90 MG extended release tablet TAKE 1 TABLET BY MOUTH EVERY DAY Yes JAY JAY Rogers CNP   ferrous sulfate (IRON 325) 325 (65 Fe) MG tablet Take 325 mg by mouth daily (with breakfast) Yes Historical Provider, MD   triamcinolone (KENALOG) 0.1 % cream APPLY SMALL AMOUNT 2 TIMES DAILY Yes JAY JAY Rogers CNP   Zinc Sulfate (ZINC 15 PO) Take 15 mg by mouth daily Yes Historical Provider, MD   Menaquinone-7 (VITAMIN K2 PO) Take 800 mcg by mouth daily Yes Historical Provider, MD Carrillo Ela (BOSWELLIA PO) Take 277 mg by mouth daily Yes Historical Provider, MD   Psyllium (METAMUCIL FIBER PO) Take

## 2022-12-02 NOTE — PATIENT INSTRUCTIONS
Personalized Preventive Plan for Nury Munson - 12/2/2022  Medicare offers a range of preventive health benefits. Some of the tests and screenings are paid in full while other may be subject to a deductible, co-insurance, and/or copay. Some of these benefits include a comprehensive review of your medical history including lifestyle, illnesses that may run in your family, and various assessments and screenings as appropriate. After reviewing your medical record and screening and assessments performed today your provider may have ordered immunizations, labs, imaging, and/or referrals for you. A list of these orders (if applicable) as well as your Preventive Care list are included within your After Visit Summary for your review. Other Preventive Recommendations:    A preventive eye exam performed by an eye specialist is recommended every 1-2 years to screen for glaucoma; cataracts, macular degeneration, and other eye disorders. A preventive dental visit is recommended every 6 months. Try to get at least 150 minutes of exercise per week or 10,000 steps per day on a pedometer . Order or download the FREE \"Exercise & Physical Activity: Your Everyday Guide\" from [employer]. Call 8-246.736.5789 or search [employer] online. You need 1217-0326 mg of calcium and 1440-5344 IU of vitamin D per day. It is possible to meet your calcium requirement with diet alone, but a vitamin D supplement is usually necessary to meet this goal.  When exposed to the sun, use a sunscreen that protects against both UVA and UVB radiation with an SPF of 30 or greater. Reapply every 2 to 3 hours or after sweating, drying off with a towel, or swimming. Always wear a seat belt when traveling in a car. Always wear a helmet when riding a bicycle or motorcycle.

## 2022-12-02 NOTE — ACP (ADVANCE CARE PLANNING)
Advance Care Planning     Advance Care Planning (ACP) Physician/NP/PA Conversation    Date of Conversation: 12/2/2022  Conducted with: Patient with Decision Making Capacity    Healthcare Decision Maker:      Primary Decision Maker: K[place] [address] - [phone]    Primary Decision Maker: Jai Guillaume - Emergency Contact - [phone]    Secondary Decision Maker: Dori Riggs - Other Relative - [phone]    Click here to complete Healthcare Decision Makers including selection of the Healthcare Decision Maker Relationship (ie \"Primary\")  Today we documented Decision Maker(s) consistent with ACP documents on file. Care Preferences:    Hospitalization: \"If your health worsens and it becomes clear that your chance of recovery is unlikely, what would be your preference regarding hospitalization? \"  The patient would prefer hospitalization. Ventilation: \"If you were unable to breath on your own and your chance of recovery was unlikely, what would be your preference about the use of a ventilator (breathing machine) if it was available to you? \"  The patient would desire the use of a ventilator. Resuscitation: \"In the event your heart stopped as a result of an underlying serious health condition, would you want attempts made to restart your heart, or would you prefer a natural death? \"  Yes, attempt to resuscitate.     treatment goals    Conversation Outcomes / Follow-Up Plan:  ACP complete - no further action today  Reviewed DNR/DNI and patient elects Full Code (Attempt Resuscitation)    Length of Voluntary ACP Conversation in minutes:  <16 minutes (Non-Billable)    ABIGAIL HAWTHORNE, APRN - CNP

## 2022-12-03 LAB — PROSTATE SPECIFIC ANTIGEN: 0.88 NG/ML (ref 0–4)

## 2023-05-11 DIAGNOSIS — I10 ESSENTIAL HYPERTENSION: ICD-10-CM

## 2023-05-11 RX ORDER — NIFEDIPINE 90 MG/1
TABLET, FILM COATED, EXTENDED RELEASE ORAL
Qty: 90 TABLET | Refills: 3 | Status: SHIPPED | OUTPATIENT
Start: 2023-05-11

## 2023-05-30 ASSESSMENT — PATIENT HEALTH QUESTIONNAIRE - PHQ9
1. LITTLE INTEREST OR PLEASURE IN DOING THINGS: NOT AT ALL
SUM OF ALL RESPONSES TO PHQ9 QUESTIONS 1 & 2: 0
2. FEELING DOWN, DEPRESSED OR HOPELESS: 0
SUM OF ALL RESPONSES TO PHQ QUESTIONS 1-9: 0
1. LITTLE INTEREST OR PLEASURE IN DOING THINGS: 0
2. FEELING DOWN, DEPRESSED OR HOPELESS: NOT AT ALL
SUM OF ALL RESPONSES TO PHQ QUESTIONS 1-9: 0
SUM OF ALL RESPONSES TO PHQ9 QUESTIONS 1 & 2: 0

## 2023-06-02 ENCOUNTER — OFFICE VISIT (OUTPATIENT)
Dept: FAMILY MEDICINE CLINIC | Age: 78
End: 2023-06-02

## 2023-06-02 VITALS
SYSTOLIC BLOOD PRESSURE: 110 MMHG | DIASTOLIC BLOOD PRESSURE: 70 MMHG | OXYGEN SATURATION: 94 % | RESPIRATION RATE: 18 BRPM | WEIGHT: 233 LBS | HEART RATE: 89 BPM | BODY MASS INDEX: 33.67 KG/M2

## 2023-06-02 DIAGNOSIS — E11.9 TYPE 2 DIABETES MELLITUS WITHOUT COMPLICATION, WITHOUT LONG-TERM CURRENT USE OF INSULIN (HCC): ICD-10-CM

## 2023-06-02 DIAGNOSIS — E78.5 HYPERLIPIDEMIA DUE TO TYPE 2 DIABETES MELLITUS (HCC): ICD-10-CM

## 2023-06-02 DIAGNOSIS — I71.21 ANEURYSM OF ASCENDING AORTA WITHOUT RUPTURE (HCC): ICD-10-CM

## 2023-06-02 DIAGNOSIS — E78.5 HYPERLIPIDEMIA DUE TO TYPE 2 DIABETES MELLITUS (HCC): Primary | ICD-10-CM

## 2023-06-02 DIAGNOSIS — E11.69 HYPERLIPIDEMIA DUE TO TYPE 2 DIABETES MELLITUS (HCC): ICD-10-CM

## 2023-06-02 DIAGNOSIS — E11.69 HYPERLIPIDEMIA DUE TO TYPE 2 DIABETES MELLITUS (HCC): Primary | ICD-10-CM

## 2023-06-02 LAB
ALBUMIN SERPL-MCNC: 4.6 G/DL (ref 3.4–5)
ALBUMIN/GLOB SERPL: 1.6 {RATIO} (ref 1.1–2.2)
ALP SERPL-CCNC: 69 U/L (ref 40–129)
ALT SERPL-CCNC: 45 U/L (ref 10–40)
ANION GAP SERPL CALCULATED.3IONS-SCNC: 14 MMOL/L (ref 3–16)
AST SERPL-CCNC: 40 U/L (ref 15–37)
BILIRUB SERPL-MCNC: 0.6 MG/DL (ref 0–1)
BUN SERPL-MCNC: 18 MG/DL (ref 7–20)
CALCIUM SERPL-MCNC: 9.6 MG/DL (ref 8.3–10.6)
CHLORIDE SERPL-SCNC: 101 MMOL/L (ref 99–110)
CHOLEST SERPL-MCNC: 222 MG/DL (ref 0–199)
CO2 SERPL-SCNC: 27 MMOL/L (ref 21–32)
CREAT SERPL-MCNC: 1.1 MG/DL (ref 0.8–1.3)
GFR SERPLBLD CREATININE-BSD FMLA CKD-EPI: >60 ML/MIN/{1.73_M2}
GLUCOSE SERPL-MCNC: 116 MG/DL (ref 70–99)
HDLC SERPL-MCNC: 67 MG/DL (ref 40–60)
LDLC SERPL CALC-MCNC: 135 MG/DL
POTASSIUM SERPL-SCNC: 4.5 MMOL/L (ref 3.5–5.1)
PROT SERPL-MCNC: 7.5 G/DL (ref 6.4–8.2)
SODIUM SERPL-SCNC: 142 MMOL/L (ref 136–145)
TRIGL SERPL-MCNC: 100 MG/DL (ref 0–150)
VLDLC SERPL CALC-MCNC: 20 MG/DL

## 2023-06-02 NOTE — PROGRESS NOTES
Ursula Mcmahon (:  1945) is a 78 y.o. male,Established patient, here for evaluation of the following chief complaint(s):  Hypertension         ASSESSMENT/PLAN:  1. Hyperlipidemia due to type 2 diabetes mellitus ([facility] [address]  -     Lipid Panel; Future  -     Comprehensive Metabolic Panel; Future  2. Aneurysm of ascending aorta without rupture ([facility] [address]  3. Type 2 diabetes mellitus without complication, without long-term current use of insulin (HCC)  -     Hemoglobin A1C; Future      Echo  with stable aortic root and aortic ascending aorta, mildly enlarged. Recommend checking with insurance regarding nifidipine, now costing 107.00 for 90 day supply. Return in about 6 months (around 2023) for hyperlipidemia, diabetes, HTN. Subjective   SUBJECTIVE/OBJECTIVE:  HPI      For routine follow up HTN. Weight down 4 pounds over the past year. Stable    Has cut out potatoes and limiting bread. Continues to be active. No new concerns. Review of Systems   All other systems reviewed and are negative. Objective   Physical Exam  Constitutional:       Appearance: Normal appearance. Cardiovascular:      Rate and Rhythm: Normal rate. Pulmonary:      Effort: Pulmonary effort is normal.   Musculoskeletal:         General: Normal range of motion. Neurological:      Mental Status: He is alert and oriented to person, place, and time.    Psychiatric:         Behavior: Behavior normal.                An electronic signature was used to authenticate this note.    --JAY JAY MARSHALL - CNP

## 2023-06-03 LAB
EST. AVERAGE GLUCOSE BLD GHB EST-MCNC: 128.4 MG/DL
HBA1C MFR BLD: 6.1 %

## 2023-07-13 DIAGNOSIS — M54.50 ACUTE MIDLINE LOW BACK PAIN WITHOUT SCIATICA: ICD-10-CM

## 2023-07-13 RX ORDER — METHOCARBAMOL 750 MG/1
750 TABLET, FILM COATED ORAL 3 TIMES DAILY PRN
Qty: 30 TABLET | Refills: 1 | Status: SHIPPED | OUTPATIENT
Start: 2023-07-13

## 2023-07-13 NOTE — TELEPHONE ENCOUNTER
Refill Request     CONFIRM preferred pharmacy with the patient. If Mail Order Rx - Pend for 90 day refill. Last Seen: Last Seen Department: 6/2/2023  Last Seen by PCP: 6/2/2023    Last Written: 12/02/2022 30 tablet 1 refills     If no future appointment scheduled:  Review the last OV with PCP and review information for follow-up visit,  Route STAFF MESSAGE with patient name to the [facility] for scheduling with the following information:            -  Timing of next visit           -  Visit type ie Physical, OV, etc           -  Diagnoses/Reason ie. COPD, HTN - Do not use MEDICATION, Follow-up or CHECK UP - Give reason for visit      Next Appointment:   Future Appointments   Date Time Provider [address] [place] [address]   12/6/2023  8:00 AM JAY JAY Brannon - CNP [facility]  [place] [facility] [place]       Message sent to [address] [place] [address] to schedule appt with patient?   NO      Requested Prescriptions     Pending Prescriptions Disp Refills    methocarbamol (ROBAXIN) 750 MG tablet [Pharmacy Med Name: METHOCARBAMOL 750 MG TABLET] 30 tablet 1     Sig: TAKE 1 TABLET BY MOUTH 3 TIMES DAILY AS NEEDED (BACK PAIN)

## 2023-09-18 ENCOUNTER — APPOINTMENT (OUTPATIENT)
Dept: GENERAL RADIOLOGY | Age: 78
End: 2023-09-18
Payer: COMMERCIAL

## 2023-09-18 ENCOUNTER — HOSPITAL ENCOUNTER (OUTPATIENT)
Age: 78
Setting detail: OBSERVATION
Discharge: LEFT BEFORE DISCHARGE | End: 2023-09-18
Attending: STUDENT IN AN ORGANIZED HEALTH CARE EDUCATION/TRAINING PROGRAM | Admitting: INTERNAL MEDICINE
Payer: COMMERCIAL

## 2023-09-18 VITALS
HEART RATE: 90 BPM | RESPIRATION RATE: 21 BRPM | HEIGHT: 69 IN | TEMPERATURE: 97.9 F | BODY MASS INDEX: 34.07 KG/M2 | WEIGHT: 230 LBS | OXYGEN SATURATION: 94 % | SYSTOLIC BLOOD PRESSURE: 129 MMHG | DIASTOLIC BLOOD PRESSURE: 79 MMHG

## 2023-09-18 DIAGNOSIS — N39.0 URINARY TRACT INFECTION WITHOUT HEMATURIA, SITE UNSPECIFIED: ICD-10-CM

## 2023-09-18 DIAGNOSIS — Z53.29 LEFT AGAINST MEDICAL ADVICE: ICD-10-CM

## 2023-09-18 DIAGNOSIS — R07.9 CHEST PAIN, UNSPECIFIED TYPE: Primary | ICD-10-CM

## 2023-09-18 LAB
ALBUMIN SERPL-MCNC: 4 G/DL (ref 3.4–5)
ALBUMIN/GLOB SERPL: 1.3 {RATIO} (ref 1.1–2.2)
ALP SERPL-CCNC: 64 U/L (ref 40–129)
ALT SERPL-CCNC: 42 U/L (ref 10–40)
ANION GAP SERPL CALCULATED.3IONS-SCNC: 11 MMOL/L (ref 3–16)
AST SERPL-CCNC: 32 U/L (ref 15–37)
BACTERIA URNS QL MICRO: ABNORMAL /HPF
BASOPHILS # BLD: 0.1 K/UL (ref 0–0.2)
BASOPHILS NFR BLD: 1 %
BILIRUB SERPL-MCNC: 0.6 MG/DL (ref 0–1)
BILIRUB UR QL STRIP.AUTO: NEGATIVE
BUN SERPL-MCNC: 18 MG/DL (ref 7–20)
CALCIUM SERPL-MCNC: 9.7 MG/DL (ref 8.3–10.6)
CHLORIDE SERPL-SCNC: 99 MMOL/L (ref 99–110)
CLARITY UR: CLEAR
CO2 SERPL-SCNC: 25 MMOL/L (ref 21–32)
COARSE GRAN CASTS #/AREA URNS LPF: ABNORMAL /LPF (ref 0–2)
COLOR UR: YELLOW
CREAT SERPL-MCNC: 1.2 MG/DL (ref 0.8–1.3)
DEPRECATED RDW RBC AUTO: 13.7 % (ref 12.4–15.4)
EKG ATRIAL RATE: 99 BPM
EKG DIAGNOSIS: NORMAL
EKG P AXIS: 6 DEGREES
EKG P-R INTERVAL: 138 MS
EKG Q-T INTERVAL: 354 MS
EKG QRS DURATION: 70 MS
EKG QTC CALCULATION (BAZETT): 454 MS
EKG R AXIS: 27 DEGREES
EKG T AXIS: 14 DEGREES
EKG VENTRICULAR RATE: 99 BPM
EOSINOPHIL # BLD: 0.4 K/UL (ref 0–0.6)
EOSINOPHIL NFR BLD: 4.2 %
EPI CELLS #/AREA URNS HPF: ABNORMAL /HPF (ref 0–5)
GFR SERPLBLD CREATININE-BSD FMLA CKD-EPI: >60 ML/MIN/{1.73_M2}
GLUCOSE SERPL-MCNC: 173 MG/DL (ref 70–99)
GLUCOSE UR STRIP.AUTO-MCNC: NEGATIVE MG/DL
HCT VFR BLD AUTO: 45.1 % (ref 40.5–52.5)
HGB BLD-MCNC: 16 G/DL (ref 13.5–17.5)
HGB UR QL STRIP.AUTO: NEGATIVE
KETONES UR STRIP.AUTO-MCNC: NEGATIVE MG/DL
LEUKOCYTE ESTERASE UR QL STRIP.AUTO: ABNORMAL
LYMPHOCYTES # BLD: 2.3 K/UL (ref 1–5.1)
LYMPHOCYTES NFR BLD: 22.9 %
MAGNESIUM SERPL-MCNC: 1.9 MG/DL (ref 1.8–2.4)
MCH RBC QN AUTO: 32.5 PG (ref 26–34)
MCHC RBC AUTO-ENTMCNC: 35.4 G/DL (ref 31–36)
MCV RBC AUTO: 91.7 FL (ref 80–100)
MONOCYTES # BLD: 1.2 K/UL (ref 0–1.3)
MONOCYTES NFR BLD: 11.5 %
NEUTROPHILS # BLD: 6.1 K/UL (ref 1.7–7.7)
NEUTROPHILS NFR BLD: 60.4 %
NITRITE UR QL STRIP.AUTO: NEGATIVE
NT-PROBNP SERPL-MCNC: 66 PG/ML (ref 0–449)
PH UR STRIP.AUTO: 7 [PH] (ref 5–8)
PLATELET # BLD AUTO: 277 K/UL (ref 135–450)
PMV BLD AUTO: 7.8 FL (ref 5–10.5)
POTASSIUM SERPL-SCNC: 3.5 MMOL/L (ref 3.5–5.1)
PROT SERPL-MCNC: 7.2 G/DL (ref 6.4–8.2)
PROT UR STRIP.AUTO-MCNC: NEGATIVE MG/DL
RBC # BLD AUTO: 4.92 M/UL (ref 4.2–5.9)
RBC #/AREA URNS HPF: ABNORMAL /HPF (ref 0–4)
RENAL EPI CELLS #/AREA UR COMP ASSIST: ABNORMAL /HPF (ref 0–1)
SODIUM SERPL-SCNC: 135 MMOL/L (ref 136–145)
SP GR UR STRIP.AUTO: 1.01 (ref 1–1.03)
TROPONIN, HIGH SENSITIVITY: 12 NG/L (ref 0–22)
TROPONIN, HIGH SENSITIVITY: 13 NG/L (ref 0–22)
UA COMPLETE W REFLEX CULTURE PNL UR: YES
UA DIPSTICK W REFLEX MICRO PNL UR: YES
URN SPEC COLLECT METH UR: ABNORMAL
UROBILINOGEN UR STRIP-ACNC: 0.2 E.U./DL
WBC # BLD AUTO: 10.1 K/UL (ref 4–11)
WBC #/AREA URNS HPF: ABNORMAL /HPF (ref 0–5)

## 2023-09-18 PROCEDURE — 83880 ASSAY OF NATRIURETIC PEPTIDE: CPT

## 2023-09-18 PROCEDURE — 83735 ASSAY OF MAGNESIUM: CPT

## 2023-09-18 PROCEDURE — G0378 HOSPITAL OBSERVATION PER HR: HCPCS

## 2023-09-18 PROCEDURE — 93010 ELECTROCARDIOGRAM REPORT: CPT | Performed by: INTERNAL MEDICINE

## 2023-09-18 PROCEDURE — 93005 ELECTROCARDIOGRAM TRACING: CPT | Performed by: STUDENT IN AN ORGANIZED HEALTH CARE EDUCATION/TRAINING PROGRAM

## 2023-09-18 PROCEDURE — 6370000000 HC RX 637 (ALT 250 FOR IP): Performed by: NURSE PRACTITIONER

## 2023-09-18 PROCEDURE — 84484 ASSAY OF TROPONIN QUANT: CPT

## 2023-09-18 PROCEDURE — 99223 1ST HOSP IP/OBS HIGH 75: CPT

## 2023-09-18 PROCEDURE — 99285 EMERGENCY DEPT VISIT HI MDM: CPT

## 2023-09-18 PROCEDURE — 71046 X-RAY EXAM CHEST 2 VIEWS: CPT

## 2023-09-18 PROCEDURE — 85025 COMPLETE CBC W/AUTO DIFF WBC: CPT

## 2023-09-18 PROCEDURE — 80053 COMPREHEN METABOLIC PANEL: CPT

## 2023-09-18 PROCEDURE — 87086 URINE CULTURE/COLONY COUNT: CPT

## 2023-09-18 PROCEDURE — 81001 URINALYSIS AUTO W/SCOPE: CPT

## 2023-09-18 PROCEDURE — 36415 COLL VENOUS BLD VENIPUNCTURE: CPT

## 2023-09-18 RX ORDER — ENOXAPARIN SODIUM 100 MG/ML
40 INJECTION SUBCUTANEOUS DAILY
Status: CANCELLED | OUTPATIENT
Start: 2023-09-18

## 2023-09-18 RX ORDER — SODIUM CHLORIDE 0.9 % (FLUSH) 0.9 %
5-40 SYRINGE (ML) INJECTION EVERY 12 HOURS SCHEDULED
Status: CANCELLED | OUTPATIENT
Start: 2023-09-18

## 2023-09-18 RX ORDER — ONDANSETRON 2 MG/ML
4 INJECTION INTRAMUSCULAR; INTRAVENOUS EVERY 6 HOURS PRN
Status: CANCELLED | OUTPATIENT
Start: 2023-09-18

## 2023-09-18 RX ORDER — SODIUM CHLORIDE 9 MG/ML
INJECTION, SOLUTION INTRAVENOUS PRN
Status: CANCELLED | OUTPATIENT
Start: 2023-09-18

## 2023-09-18 RX ORDER — ASPIRIN 81 MG/1
81 TABLET, CHEWABLE ORAL DAILY
Status: CANCELLED | OUTPATIENT
Start: 2023-09-19

## 2023-09-18 RX ORDER — SODIUM CHLORIDE 0.9 % (FLUSH) 0.9 %
10 SYRINGE (ML) INJECTION PRN
Status: CANCELLED | OUTPATIENT
Start: 2023-09-18

## 2023-09-18 RX ORDER — CEFUROXIME AXETIL 250 MG/1
250 TABLET ORAL 2 TIMES DAILY
Qty: 14 TABLET | Refills: 0 | Status: SHIPPED | OUTPATIENT
Start: 2023-09-18 | End: 2023-09-25

## 2023-09-18 RX ORDER — ACETAMINOPHEN 650 MG/1
650 SUPPOSITORY RECTAL EVERY 6 HOURS PRN
Status: CANCELLED | OUTPATIENT
Start: 2023-09-18

## 2023-09-18 RX ORDER — ONDANSETRON 4 MG/1
4 TABLET, ORALLY DISINTEGRATING ORAL EVERY 8 HOURS PRN
Status: CANCELLED | OUTPATIENT
Start: 2023-09-18

## 2023-09-18 RX ORDER — NITROGLYCERIN 0.4 MG/1
0.4 TABLET SUBLINGUAL EVERY 5 MIN PRN
Status: CANCELLED | OUTPATIENT
Start: 2023-09-18

## 2023-09-18 RX ORDER — ACETAMINOPHEN 325 MG/1
650 TABLET ORAL EVERY 6 HOURS PRN
Status: CANCELLED | OUTPATIENT
Start: 2023-09-18

## 2023-09-18 RX ORDER — ASPIRIN 325 MG
325 TABLET ORAL ONCE
Status: COMPLETED | OUTPATIENT
Start: 2023-09-18 | End: 2023-09-18

## 2023-09-18 RX ORDER — MAGNESIUM SULFATE 1 G/100ML
1000 INJECTION INTRAVENOUS PRN
Status: CANCELLED | OUTPATIENT
Start: 2023-09-18

## 2023-09-18 RX ORDER — POTASSIUM CHLORIDE 20 MEQ/1
40 TABLET, EXTENDED RELEASE ORAL PRN
Status: CANCELLED | OUTPATIENT
Start: 2023-09-18

## 2023-09-18 RX ORDER — POTASSIUM CHLORIDE 7.45 MG/ML
10 INJECTION INTRAVENOUS PRN
Status: CANCELLED | OUTPATIENT
Start: 2023-09-18

## 2023-09-18 RX ORDER — NITROGLYCERIN 0.4 MG/1
0.4 TABLET SUBLINGUAL EVERY 5 MIN PRN
Status: DISCONTINUED | OUTPATIENT
Start: 2023-09-18 | End: 2023-09-18 | Stop reason: HOSPADM

## 2023-09-18 RX ORDER — CEFUROXIME AXETIL 250 MG/1
250 TABLET ORAL ONCE
Status: DISCONTINUED | OUTPATIENT
Start: 2023-09-18 | End: 2023-09-18 | Stop reason: HOSPADM

## 2023-09-18 RX ORDER — POLYETHYLENE GLYCOL 3350 17 G/17G
17 POWDER, FOR SOLUTION ORAL DAILY PRN
Status: CANCELLED | OUTPATIENT
Start: 2023-09-18

## 2023-09-18 RX ADMIN — ASPIRIN 325 MG: 325 TABLET ORAL at 12:08

## 2023-09-18 ASSESSMENT — PAIN SCALES - GENERAL: PAINLEVEL_OUTOF10: 1

## 2023-09-18 ASSESSMENT — ENCOUNTER SYMPTOMS
FACIAL SWELLING: 0
RHINORRHEA: 0
ABDOMINAL PAIN: 0
SHORTNESS OF BREATH: 0
SORE THROAT: 0
COLOR CHANGE: 0

## 2023-09-18 ASSESSMENT — PAIN DESCRIPTION - LOCATION: LOCATION: CHEST

## 2023-09-18 ASSESSMENT — HEART SCORE: ECG: 0

## 2023-09-18 ASSESSMENT — PAIN - FUNCTIONAL ASSESSMENT: PAIN_FUNCTIONAL_ASSESSMENT: NONE - DENIES PAIN

## 2023-09-18 ASSESSMENT — PAIN DESCRIPTION - ORIENTATION: ORIENTATION: LEFT;UPPER

## 2023-09-18 ASSESSMENT — PAIN DESCRIPTION - DESCRIPTORS: DESCRIPTORS: ACHING

## 2023-09-18 NOTE — DISCHARGE SUMMARY
Patient left AMA. Please see AMA combined H&P and discharge summary from 9/18/23. Medication List        START taking these medications      cefUROXime 250 MG tablet  Commonly known as: CEFTIN  Take 1 tablet by mouth 2 times daily for 7 days            CONTINUE taking these medications      Handicap Placard Misc  by Does not apply route Please issue for duration of 5 years. ASK your doctor about these medications      BOSWELLIA PO     Calcium/Vitamin D 600-400 MG-UNIT Tabs     camphor-menthol 0.5-0.5 % lotion  Commonly known as: SARNA     ferrous sulfate 325 (65 Fe) MG tablet  Commonly known as: IRON 325     fexofenadine 180 MG tablet  Commonly known as: ALLEGRA     Fish Oil 600 MG Caps     ibuprofen 200 MG tablet  Commonly known as: ADVIL;MOTRIN     losartan-hydroCHLOROthiazide 50-12.5 MG per tablet  Commonly known as: HYZAAR  TAKE 1 TABLET BY MOUTH EVERY DAY     magnesium 250 MG Tabs tablet  Commonly known as: MAGNESIUM-OXIDE     METAMUCIL FIBER PO     methocarbamol 750 MG tablet  Commonly known as: ROBAXIN  Take 1 tablet by mouth 3 times daily as needed (back pain)     NIFEdipine 90 MG extended release tablet  Commonly known as: ADALAT CC  TAKE 1 TABLET BY MOUTH EVERY DAY     pantoprazole 40 MG tablet  Commonly known as: PROTONIX     POTASSIUM GLUCONATE     SOLUBLE FIBER/PROBIOTICS PO     triamcinolone 0.1 % cream  Commonly known as: KENALOG  APPLY SMALL AMOUNT 2 TIMES DAILY     TURMERIC PO     UNABLE TO FIND     * VISION VITAMINS PO     * therapeutic multivitamin-minerals tablet     VITAMIN B 12 PO     vitamin C 500 MG tablet  Commonly known as: ASCORBIC ACID     VITAMIN K2 PO     ZINC 15 PO           * This list has 2 medication(s) that are the same as other medications prescribed for you. Read the directions carefully, and ask your doctor or other care provider to review them with you.                    Where to Get Your Medications        Information about where to get these medications is not yet available    Ask your nurse or doctor about these medications  cefUROXime 250 MG tablet         Caty Kilpatrick PA-C  09/18/23

## 2023-09-18 NOTE — ED NOTES
Dr. Denny Huntley at the bedside to discuss AMA. Pt verbalized understanding of risks of leaving AMA. Pt denies any further questions.      Lo Westfall RN  09/18/23 5995

## 2023-09-18 NOTE — ED PROVIDER NOTES
I independently performed a history and physical on Rabia Soto. All diagnostic, treatment, and disposition decisions were made by myself in conjunction with the advanced practice provider/resident. Labs Reviewed   COMPREHENSIVE METABOLIC PANEL W/ REFLEX TO MG FOR LOW K - Abnormal; Notable for the following components:       Result Value    Sodium 135 (*)     Glucose 173 (*)     ALT 42 (*)     All other components within normal limits   URINALYSIS WITH REFLEX TO CULTURE - Abnormal; Notable for the following components:    Leukocyte Esterase, Urine MODERATE (*)     All other components within normal limits   MICROSCOPIC URINALYSIS - Abnormal; Notable for the following components:    WBC, UA 10-20 (*)     Bacteria, UA Rare (*)     All other components within normal limits   CULTURE, URINE   CBC WITH AUTO DIFFERENTIAL   BRAIN NATRIURETIC PEPTIDE   TROPONIN   TROPONIN   MAGNESIUM     XR CHEST (2 VW)   Final Result   No acute cardiopulmonary abnormality identified. The Ekg interpreted by me shows  normal sinus rhythm with a rate of 99  Axis is   Normal  QTc is  normal  Intervals and Durations are unremarkable. ST Segments: nonspecific changes  No significant change from prior EKG dated 12//2018    For further details of Rosmery Maurer [facility] emergency department encounter, please see the ANGELICA/resident's documentation. I personally saw the patient and performed a substantive portion of the visit including all aspects of the medical decision making. Briefly, this is a 82-year-old male presenting with concerns for substernal chest pain that started this morning and resolved upon arrival in the ED. Patient states that he believes he may have slept on it wrong. EKG with nonspecific ST abnormalities however similar compared to previous. Troponin is negative x2.   Patient with multiple cardiac risk factors and initial plan was for hospitalization however patient did not want to stay and elected to sign out [facility]. I did discuss the risks of leaving [facility] with the patient, including but not limited to heart attack, cardiac or pulmonary arrest, stroke, worsening of his condition, permanent disability and even death and he accepts these risks. He does have capacity to make his own medical decisions currently. He was advised that he may return to the ED at any point in time should he wish for further work-up or treatment of his condition. I personally saw the patient and independently provided 0 minutes of non-concurrent critical care out of the total shared critical care time provided. I, Dr. Seng Ybarra, am the primary clinician of record. 1. Chest pain, unspecified type    2. Urinary tract infection without hematuria, site unspecified          Comment: Please note this report has been produced using speech recognition software and may contain errors related to that system including errors in grammar, punctuation, and spelling, as well as words and phrases that may be inappropriate. If there are any questions or concerns please feel free to contact the dictating provider for clarification.        Deborah Chambers MD  09/18/23 0329

## 2023-09-18 NOTE — PROGRESS NOTES
Telemetry Assignment Communication Form    Patient has orders for continuous telemetry OR pulse oximeter only orders    To be filled out by Clinical    Patient has Admission or Transfer orders and is assigned to Room Number: 311    Continuous Telemetry:  Yes       WITH/WITHOUT: without Continuous pulse ox    Patient has pulse ox ONLY orders:  No    (Once this top section is completed:  Select \"Route\" and send note to Fax number: [phone] ))      ___________________________________________________________________________      [facility]    Patient assigned to tele box number: __________________               (to be written in [facility] when telemetry box is assigned to patient)    ___________________________________________________________________________      Bedside RN confirming that the box listed above is in fact the telemetry box number being placed on the patient listed above.         X________________________________________ RN signature        __________________________________________RN assigned to Patient (please print)    _______________ Date    ____________ Time

## 2023-09-18 NOTE — H&P
[facility] H&P and Discharge Summary     PCP: JAY JAY Dhaliwal CNP    Date of Admission: 9/18/2023    Date of Service: Pt seen/examined on 09/18/23     Chief Complaint:    Chief Complaint   Patient presents with    Chest Pain     Pt with CP since he woke up this morning, worse throughout the day. History Of Present Illness: The patient is a 70 y.o. male with PMH of arthritis, lema esophagus, HLD, HTN, and AAA who presented to [facility] ED with complaint of CP. He states that the chest pain started this morning and felt like an achy pain. He states that once he started to get up and do a few more things the pain worsened. He denies any SOB or radiation with this. On evaluation he states that his pain is now gone and he would like to go home AMA. Past Medical History:        Diagnosis Date    Arthritis     Lema esophagus     Hyperlipidemia     Hypertension     Leg cramps     Polyp of colon     Ringing in ears        Past Surgical History:        Procedure Laterality Date    COLONOSCOPY  2010    polyps    COLONOSCOPY  08/01/2016    Diverticulosis    TOE SURGERY Bilateral 2011    toe nails Big toes removed    UPPER GASTROINTESTINAL ENDOSCOPY  4/9/12    lema's esophagus    UPPER GASTROINTESTINAL ENDOSCOPY  2018    GERD-3 year follow up    UPPER GASTROINTESTINAL ENDOSCOPY  2014    lema's esophagus       Medications Prior to Admission:    Prior to Admission medications    Medication Sig Start Date End Date Taking?  Authorizing Provider   methocarbamol (ROBAXIN) 750 MG tablet Take 1 tablet by mouth 3 times daily as needed (back pain) 7/13/23   JAY JAY Dhaliwal CNP   NIFEdipine (ADALAT CC) 90 MG extended release tablet TAKE 1 TABLET BY MOUTH EVERY DAY 5/11/23   JAY JAY Dhaliwal CNP   pantoprazole (PROTONIX) 40 MG tablet Take 1 tablet by mouth daily 11/18/22   Historical Provider, MD   losartan-hydroCHLOROthiazide (HYZAAR) 50-12.5 MG per tablet TAKE 1

## 2023-09-19 ENCOUNTER — TELEPHONE (OUTPATIENT)
Dept: FAMILY MEDICINE CLINIC | Age: 78
End: 2023-09-19

## 2023-09-19 LAB — BACTERIA UR CULT: NORMAL

## 2023-09-19 NOTE — TELEPHONE ENCOUNTER
Care Transitions Initial Follow Up Call    Outreach made within 2 business days of discharge: Yes    Patient: Lluvia Miller Patient : 1945   MRN: [id number]  Reason for Admission: There are no discharge diagnoses documented for the most recent discharge. Discharge Date: 23       Spoke with: patient    Discharge department/facility: [facility]    TCM Interactive Patient Contact:  Was patient able to fill all prescriptions: Yes  Was patient instructed to bring all medications to the follow-up visit: Yes  Is patient taking all medications as directed in the discharge summary?  Yes  Does patient understand their discharge instructions: Yes  Does patient have questions or concerns that need addressed prior to 7-14 day follow up office visit: no    Scheduled appointment with PCP within 7-14 days    Follow Up  Future Appointments   Date Time Provider [address] [place] [address]   2023  1:30 PM JAY JAY Santoyo - CNP [facility] [place]   2023  8:00 AM JAY JAY Santoyo [address]       Christine Lance RN

## 2023-09-27 ENCOUNTER — OFFICE VISIT (OUTPATIENT)
Dept: FAMILY MEDICINE CLINIC | Age: 78
End: 2023-09-27

## 2023-09-27 VITALS
OXYGEN SATURATION: 97 % | DIASTOLIC BLOOD PRESSURE: 60 MMHG | HEART RATE: 97 BPM | BODY MASS INDEX: 34.44 KG/M2 | WEIGHT: 233.2 LBS | SYSTOLIC BLOOD PRESSURE: 110 MMHG | RESPIRATION RATE: 16 BRPM

## 2023-09-27 DIAGNOSIS — M54.50 ACUTE MIDLINE LOW BACK PAIN WITHOUT SCIATICA: ICD-10-CM

## 2023-09-27 DIAGNOSIS — I71.21 ANEURYSM OF ASCENDING AORTA WITHOUT RUPTURE (HCC): ICD-10-CM

## 2023-09-27 DIAGNOSIS — I10 ESSENTIAL HYPERTENSION: ICD-10-CM

## 2023-09-27 DIAGNOSIS — Z23 FLU VACCINE NEED: ICD-10-CM

## 2023-09-27 DIAGNOSIS — E78.2 MIXED HYPERLIPIDEMIA: ICD-10-CM

## 2023-09-27 DIAGNOSIS — R07.9 CHEST PAIN, UNSPECIFIED TYPE: Primary | ICD-10-CM

## 2023-09-27 RX ORDER — METHOCARBAMOL 750 MG/1
750 TABLET, FILM COATED ORAL 3 TIMES DAILY PRN
Qty: 30 TABLET | Refills: 1 | Status: SHIPPED | OUTPATIENT
Start: 2023-09-27

## 2023-09-27 ASSESSMENT — PATIENT HEALTH QUESTIONNAIRE - PHQ9
SUM OF ALL RESPONSES TO PHQ9 QUESTIONS 1 & 2: 0
2. FEELING DOWN, DEPRESSED OR HOPELESS: 0
SUM OF ALL RESPONSES TO PHQ QUESTIONS 1-9: 0
1. LITTLE INTEREST OR PLEASURE IN DOING THINGS: 0
SUM OF ALL RESPONSES TO PHQ QUESTIONS 1-9: 0

## 2023-09-27 NOTE — PROGRESS NOTES
Laurie Schuler (:  1945) is a 70 y.o. male,Established patient, here for evaluation of the following chief complaint(s):  Follow-up (ED- L shoulder pain)         ASSESSMENT/PLAN:  1. Chest pain, unspecified type  -     [facility], Cardiology, [facility]  2. Essential hypertension  -     [facility], Cardiology, [facility]  3. Mixed hyperlipidemia  -     [facility], Cardiology, [facility]  4. Aneurysm of ascending aorta without rupture [facility])  -     [facility], [facility]  5. Acute midline low back pain without sciatica  -     methocarbamol (ROBAXIN) 750 MG tablet; Take 1 tablet by mouth 3 times daily as needed (back pain), Disp-30 tablet, R-1Normal  6. Flu vaccine need  -     Influenza, FLUAD, (age 72 y+), IM, Preservative Free, 0.5 mL    Recommend aspirin 81 mg daily    Reviewed recent cholesterol, LDL persistently elevated. Trying to manage with diet. Current weight stable. Discussed need to consider restarting medication. Given elevated lipid, symptoms, echo with aortic stenosis will refer to cardiology for additional consideration. Likely will need stress test.     Reviewed labs, echocardiogram, EKG, chest xray 2023 and 6/3/2022. Recommend covid vaccine at pharmacy. Has appt scheduled for AWV 2023. No follow-ups on file. Subjective   SUBJECTIVE/OBJECTIVE:  HPI    Awakened Monday and noted pain left shoulder to chest. While driving note aching in back and shoulder more. Drove to the ER and evaluated quickly. As soon as he lie down on the bed is lessened significantly. Labs, EKG and xray stable and pain was going. Subsequently signed out AMA. Urine dip was suspicious for UTI and started ceftin. Has completed. The culture was negative for bacteria. Reviewed today.  Encouraged to have stress test. States he had 1 20 years ago and was normal. Declined this time related to he feels he is not

## 2023-10-24 ENCOUNTER — OFFICE VISIT (OUTPATIENT)
Dept: CARDIOLOGY CLINIC | Age: 78
End: 2023-10-24
Payer: COMMERCIAL

## 2023-10-24 VITALS
SYSTOLIC BLOOD PRESSURE: 142 MMHG | OXYGEN SATURATION: 94 % | HEIGHT: 69 IN | WEIGHT: 236 LBS | BODY MASS INDEX: 34.96 KG/M2 | HEART RATE: 100 BPM | DIASTOLIC BLOOD PRESSURE: 84 MMHG

## 2023-10-24 DIAGNOSIS — E78.5 HYPERLIPIDEMIA DUE TO TYPE 2 DIABETES MELLITUS (HCC): Primary | ICD-10-CM

## 2023-10-24 DIAGNOSIS — Z76.89 ESTABLISHING CARE WITH NEW DOCTOR, ENCOUNTER FOR: ICD-10-CM

## 2023-10-24 DIAGNOSIS — I10 ESSENTIAL HYPERTENSION: ICD-10-CM

## 2023-10-24 DIAGNOSIS — E11.69 HYPERLIPIDEMIA DUE TO TYPE 2 DIABETES MELLITUS (HCC): Primary | ICD-10-CM

## 2023-10-24 DIAGNOSIS — R07.9 CHEST PAIN, UNSPECIFIED TYPE: ICD-10-CM

## 2023-10-24 PROCEDURE — 1123F ACP DISCUSS/DSCN MKR DOCD: CPT | Performed by: STUDENT IN AN ORGANIZED HEALTH CARE EDUCATION/TRAINING PROGRAM

## 2023-10-24 PROCEDURE — 3044F HG A1C LEVEL LT 7.0%: CPT | Performed by: STUDENT IN AN ORGANIZED HEALTH CARE EDUCATION/TRAINING PROGRAM

## 2023-10-24 PROCEDURE — 99204 OFFICE O/P NEW MOD 45 MIN: CPT | Performed by: STUDENT IN AN ORGANIZED HEALTH CARE EDUCATION/TRAINING PROGRAM

## 2023-10-24 PROCEDURE — 93000 ELECTROCARDIOGRAM COMPLETE: CPT | Performed by: STUDENT IN AN ORGANIZED HEALTH CARE EDUCATION/TRAINING PROGRAM

## 2023-10-24 PROCEDURE — 3077F SYST BP >= 140 MM HG: CPT | Performed by: STUDENT IN AN ORGANIZED HEALTH CARE EDUCATION/TRAINING PROGRAM

## 2023-10-24 PROCEDURE — 3079F DIAST BP 80-89 MM HG: CPT | Performed by: STUDENT IN AN ORGANIZED HEALTH CARE EDUCATION/TRAINING PROGRAM

## 2023-10-24 RX ORDER — ROSUVASTATIN CALCIUM 5 MG/1
5 TABLET, COATED ORAL
Qty: 12 TABLET | Refills: 3 | Status: SHIPPED | OUTPATIENT
Start: 2023-10-25

## 2023-10-24 NOTE — PATIENT INSTRUCTIONS
Plan:  EKG today  Recommend starting enteric coated Aspirin 81 mg daily. Start Crestor 5 mg Monday Wednesday Friday  Recommend a stress test- [employer] Myoview to evaluate possible blockage in the heart arteries . Patient is not able to walk on a treadmill due to   Your provider has ordered testing for further evaluation. An order/prescription has been included in your paper work. To schedule outpatient testing, contact Central Scheduling by calling "[employer]" ([phone]).   Continue current medication regiment    Follow up 3 months

## 2023-10-24 NOTE — PROGRESS NOTES
CARDIOLOGY CONSULTATION      Patient Name: Erwin Varma  Primary Care physician: JAY JAY Rizzo - CNP    Reason for Referral/Chief Complaint: Erwin Varma is a 70 y.o. patient who is referred to cardiology clinic today for evaluation and treatment of chest pain. History of Present Illness:   Erwin Varma is a 70 y.o. male present to day  as a new patient  at the request of [facility] Dr. Fabricio Suarez for chest pain. Patient has past medical history of  HTN, HLD,  and Chest pain patient  was seen in emergency department on 9/28/23. EKG 9/18/2023 Normal sinus rhythm Baseline artifactLow voltage QRSSeptal infarct. Today patient presents he went to ED and was lay down and started to have  he stated he was driving when he experience the chest pain and drove him self to the ED. Patient states he has pain in the abdomin and pushed when he sneezing and pushes to hard. He was given muscle relaxer's to help the pain for several years. The patient denies , shortness of breath at rest and dyspnea with exertion. Denies palpitations, dizziness, near-syncope or olivia syncope. Denies paroxysmal nocturnal dyspnea, orthopnea, bendopnea, increasing lower extremity edema or weight gain. The patient endorses highest level of activity as tolerated. Stated he works out exercise and stretches, not SOB with exercise . Patient checks BP daily reports 120/80 readings. Patient reported father passed from heart disease at age 71. Stated he was placed on a statin years ago and had myalgias. Past Medical History:   has a past medical history of Arthritis, Geller esophagus, Hyperlipidemia, Hypertension, Leg cramps, Polyp of colon, and Ringing in ears. Surgical History:   has a past surgical history that includes Toe Surgery (Bilateral, 2011); Colonoscopy (2010); Colonoscopy (08/01/2016); Upper gastrointestinal endoscopy (4/9/12); Upper gastrointestinal endoscopy (2018); and Upper gastrointestinal endoscopy (2014).

## 2023-10-27 DIAGNOSIS — E78.2 MIXED HYPERLIPIDEMIA: ICD-10-CM

## 2023-10-27 RX ORDER — LOSARTAN POTASSIUM AND HYDROCHLOROTHIAZIDE 12.5; 5 MG/1; MG/1
TABLET ORAL
Qty: 90 TABLET | Refills: 1 | Status: SHIPPED | OUTPATIENT
Start: 2023-10-27

## 2023-10-27 NOTE — TELEPHONE ENCOUNTER
.Refill Request     CONFIRM preferred pharmacy with the patient. If Mail Order Rx - Pend for 90 day refill. Last Seen: Last Seen Department: 9/27/2023  Last Seen by PCP: 9/27/2023    Last Written: 11-2-22 90 with 3     If no future appointment scheduled:  Review the last OV with PCP and review information for follow-up visit,  Route STAFF MESSAGE with patient name to the [facility] for scheduling with the following information:            -  Timing of next visit           -  Visit type ie Physical, OV, etc           -  Diagnoses/Reason ie. COPD, HTN - Do not use MEDICATION, Follow-up or CHECK UP - Give reason for visit      Next Appointment:   Future Appointments   Date Time Provider [address]   11/2/2023  9:30  [facility] [place] [facility] Rad   11/2/2023 10:00 AM [address] [place] [address]   12/6/2023  8:00 AM Tami Richards, APRN - CNP [facility] [place] - DYJENNY   12/28/2023  2:30 PM A ECHO ROOM [facility] AND CAR [facility] HOD   1/30/2024  8:00 AM Jagjit Palomino, DO P CLER CAR MMA       Message sent to [address] [place] [address] to schedule appt with patient?   N/A      Requested Prescriptions     Pending Prescriptions Disp Refills    losartan-hydroCHLOROthiazide (HYZAAR) 50-12.5 MG per tablet [Pharmacy Med Name: LOSARTAN-HCTZ 50-12.5 MG TAB] 90 tablet 1     Sig: TAKE 1 TABLET BY MOUTH EVERY DAY

## 2023-11-02 ENCOUNTER — HOSPITAL ENCOUNTER (OUTPATIENT)
Dept: NON INVASIVE DIAGNOSTICS | Age: 78
Discharge: HOME | End: 2023-11-02
Payer: COMMERCIAL

## 2023-11-02 ENCOUNTER — HOSPITAL ENCOUNTER (OUTPATIENT)
Dept: NUCLEAR MEDICINE | Age: 78
Discharge: HOME | End: 2023-11-02
Payer: COMMERCIAL

## 2023-11-02 DIAGNOSIS — I10 ESSENTIAL HYPERTENSION: ICD-10-CM

## 2023-11-02 DIAGNOSIS — E11.69 HYPERLIPIDEMIA DUE TO TYPE 2 DIABETES MELLITUS (HCC): ICD-10-CM

## 2023-11-02 DIAGNOSIS — E78.5 HYPERLIPIDEMIA DUE TO TYPE 2 DIABETES MELLITUS (HCC): ICD-10-CM

## 2023-11-02 PROCEDURE — 6360000002 HC RX W HCPCS: Performed by: STUDENT IN AN ORGANIZED HEALTH CARE EDUCATION/TRAINING PROGRAM

## 2023-11-02 PROCEDURE — 78452 HT MUSCLE IMAGE SPECT MULT: CPT

## 2023-11-02 PROCEDURE — 3430000000 HC RX DIAGNOSTIC RADIOPHARMACEUTICAL: Performed by: STUDENT IN AN ORGANIZED HEALTH CARE EDUCATION/TRAINING PROGRAM

## 2023-11-02 PROCEDURE — A9502 TC99M TETROFOSMIN: HCPCS | Performed by: STUDENT IN AN ORGANIZED HEALTH CARE EDUCATION/TRAINING PROGRAM

## 2023-11-02 PROCEDURE — 93017 CV STRESS TEST TRACING ONLY: CPT

## 2023-11-02 RX ORDER — REGADENOSON 0.08 MG/ML
0.4 INJECTION, SOLUTION INTRAVENOUS
Status: COMPLETED | OUTPATIENT
Start: 2023-11-02 | End: 2023-11-02

## 2023-11-02 RX ADMIN — TETROFOSMIN 10.8 MILLICURIE: 1.38 INJECTION, POWDER, LYOPHILIZED, FOR SOLUTION INTRAVENOUS at 09:20

## 2023-11-02 RX ADMIN — REGADENOSON 0.4 MG: 0.08 INJECTION, SOLUTION INTRAVENOUS at 11:20

## 2023-11-02 RX ADMIN — TETROFOSMIN 34.5 MILLICURIE: 1.38 INJECTION, POWDER, LYOPHILIZED, FOR SOLUTION INTRAVENOUS at 11:20

## 2023-11-03 ENCOUNTER — TELEPHONE (OUTPATIENT)
Dept: CARDIOLOGY CLINIC | Age: 78
End: 2023-11-03

## 2023-11-03 NOTE — TELEPHONE ENCOUNTER
----- Message from Niko Lepe DO sent at 11/3/2023  6:25 AM EDT -----  Please call Mr. Olivares Creed and let him know that his stress test was normal. There is no evidence of a blockage or of prior heart attack. Also, please make sure he has scheduled his echocardiogram. Thanks. AMP    Summary   There is normal isotope uptake at stress and rest. There is no evidence of   myocardial ischemia or scar. Hyperdynamic LV systolic function with DV>48%   with uniform wall motion. Low risk study.

## 2023-12-05 SDOH — HEALTH STABILITY: PHYSICAL HEALTH: ON AVERAGE, HOW MANY DAYS PER WEEK DO YOU ENGAGE IN MODERATE TO STRENUOUS EXERCISE (LIKE A BRISK WALK)?: 3 DAYS

## 2023-12-05 SDOH — ECONOMIC STABILITY: INCOME INSECURITY: HOW HARD IS IT FOR YOU TO PAY FOR THE VERY BASICS LIKE FOOD, HOUSING, MEDICAL CARE, AND HEATING?: NOT HARD AT ALL

## 2023-12-05 SDOH — ECONOMIC STABILITY: FOOD INSECURITY: WITHIN THE PAST 12 MONTHS, THE FOOD YOU BOUGHT JUST DIDN'T LAST AND YOU DIDN'T HAVE MONEY TO GET MORE.: NEVER TRUE

## 2023-12-05 SDOH — HEALTH STABILITY: PHYSICAL HEALTH: ON AVERAGE, HOW MANY MINUTES DO YOU ENGAGE IN EXERCISE AT THIS LEVEL?: 10 MIN

## 2023-12-05 SDOH — ECONOMIC STABILITY: FOOD INSECURITY: WITHIN THE PAST 12 MONTHS, YOU WORRIED THAT YOUR FOOD WOULD RUN OUT BEFORE YOU GOT MONEY TO BUY MORE.: NEVER TRUE

## 2023-12-05 ASSESSMENT — LIFESTYLE VARIABLES
HOW OFTEN DURING THE LAST YEAR HAVE YOU NEEDED AN ALCOHOLIC DRINK FIRST THING IN THE MORNING TO GET YOURSELF GOING AFTER A NIGHT OF HEAVY DRINKING: 0
HOW MANY STANDARD DRINKS CONTAINING ALCOHOL DO YOU HAVE ON A TYPICAL DAY: 1 OR 2
HOW OFTEN DURING THE LAST YEAR HAVE YOU HAD A FEELING OF GUILT OR REMORSE AFTER DRINKING: NEVER
HOW OFTEN DURING THE LAST YEAR HAVE YOU BEEN UNABLE TO REMEMBER WHAT HAPPENED THE NIGHT BEFORE BECAUSE YOU HAD BEEN DRINKING: NEVER
HOW OFTEN DURING THE LAST YEAR HAVE YOU HAD A FEELING OF GUILT OR REMORSE AFTER DRINKING: 0
HOW OFTEN DO YOU HAVE A DRINK CONTAINING ALCOHOL: 5
HOW OFTEN DO YOU HAVE SIX OR MORE DRINKS ON ONE OCCASION: 1
HOW OFTEN DURING THE LAST YEAR HAVE YOU NEEDED AN ALCOHOLIC DRINK FIRST THING IN THE MORNING TO GET YOURSELF GOING AFTER A NIGHT OF HEAVY DRINKING: NEVER
HAVE YOU OR SOMEONE ELSE BEEN INJURED AS A RESULT OF YOUR DRINKING: 0
HAS A RELATIVE, FRIEND, DOCTOR, OR ANOTHER HEALTH PROFESSIONAL EXPRESSED CONCERN ABOUT YOUR DRINKING OR SUGGESTED YOU CUT DOWN: NO
HOW OFTEN DURING THE LAST YEAR HAVE YOU FAILED TO DO WHAT WAS NORMALLY EXPECTED FROM YOU BECAUSE OF DRINKING: 0
HOW MANY STANDARD DRINKS CONTAINING ALCOHOL DO YOU HAVE ON A TYPICAL DAY: 1
HAVE YOU OR SOMEONE ELSE BEEN INJURED AS A RESULT OF YOUR DRINKING: NO
HOW OFTEN DURING THE LAST YEAR HAVE YOU FOUND THAT YOU WERE NOT ABLE TO STOP DRINKING ONCE YOU HAD STARTED: NEVER
HOW OFTEN DO YOU HAVE A DRINK CONTAINING ALCOHOL: 4 OR MORE TIMES A WEEK
HAS A RELATIVE, FRIEND, DOCTOR, OR ANOTHER HEALTH PROFESSIONAL EXPRESSED CONCERN ABOUT YOUR DRINKING OR SUGGESTED YOU CUT DOWN: 0
HOW OFTEN DURING THE LAST YEAR HAVE YOU BEEN UNABLE TO REMEMBER WHAT HAPPENED THE NIGHT BEFORE BECAUSE YOU HAD BEEN DRINKING: 0
HOW OFTEN DURING THE LAST YEAR HAVE YOU FAILED TO DO WHAT WAS NORMALLY EXPECTED FROM YOU BECAUSE OF DRINKING: NEVER
HOW OFTEN DURING THE LAST YEAR HAVE YOU FOUND THAT YOU WERE NOT ABLE TO STOP DRINKING ONCE YOU HAD STARTED: 0

## 2023-12-05 ASSESSMENT — PATIENT HEALTH QUESTIONNAIRE - PHQ9
1. LITTLE INTEREST OR PLEASURE IN DOING THINGS: 0
SUM OF ALL RESPONSES TO PHQ QUESTIONS 1-9: 0
SUM OF ALL RESPONSES TO PHQ QUESTIONS 1-9: 0
2. FEELING DOWN, DEPRESSED OR HOPELESS: 0
SUM OF ALL RESPONSES TO PHQ9 QUESTIONS 1 & 2: 0
SUM OF ALL RESPONSES TO PHQ QUESTIONS 1-9: 0
SUM OF ALL RESPONSES TO PHQ QUESTIONS 1-9: 0

## 2023-12-06 ENCOUNTER — OFFICE VISIT (OUTPATIENT)
Dept: FAMILY MEDICINE CLINIC | Age: 78
End: 2023-12-06

## 2023-12-06 VITALS
HEIGHT: 69 IN | BODY MASS INDEX: 34.57 KG/M2 | DIASTOLIC BLOOD PRESSURE: 70 MMHG | SYSTOLIC BLOOD PRESSURE: 122 MMHG | OXYGEN SATURATION: 95 % | HEART RATE: 89 BPM | RESPIRATION RATE: 18 BRPM | WEIGHT: 233.4 LBS

## 2023-12-06 DIAGNOSIS — E78.5 HYPERLIPIDEMIA DUE TO TYPE 2 DIABETES MELLITUS (HCC): ICD-10-CM

## 2023-12-06 DIAGNOSIS — Z00.00 MEDICARE ANNUAL WELLNESS VISIT, SUBSEQUENT: Primary | ICD-10-CM

## 2023-12-06 DIAGNOSIS — Z12.5 PROSTATE CANCER SCREENING: ICD-10-CM

## 2023-12-06 DIAGNOSIS — E11.59 HYPERTENSION ASSOCIATED WITH TYPE 2 DIABETES MELLITUS (HCC): ICD-10-CM

## 2023-12-06 DIAGNOSIS — I15.2 HYPERTENSION ASSOCIATED WITH TYPE 2 DIABETES MELLITUS (HCC): ICD-10-CM

## 2023-12-06 DIAGNOSIS — E11.69 HYPERLIPIDEMIA DUE TO TYPE 2 DIABETES MELLITUS (HCC): ICD-10-CM

## 2023-12-06 DIAGNOSIS — E11.9 TYPE 2 DIABETES MELLITUS WITHOUT COMPLICATION, WITHOUT LONG-TERM CURRENT USE OF INSULIN (HCC): ICD-10-CM

## 2023-12-06 NOTE — PROGRESS NOTES
Medicare Annual Wellness Visit    Masoud Lacy is here for Medicare AWV    Assessment & Plan   Medicare annual wellness visit, subsequent  Hypertension associated with type 2 diabetes mellitus ([address])  -     Comprehensive Metabolic Panel; Future  Prostate cancer screening  -     PSA Screening; Future  Type 2 diabetes mellitus without complication, without long-term current use of insulin (HCC)  -     Hemoglobin A1C; Future  Hyperlipidemia due to type 2 diabetes mellitus ([address])  -     Lipid Panel; Future  -     Comprehensive Metabolic Panel; Future    Will have fasting labs done week of 1/22/2024 prior to follow up with Dr. Abner Motley    Recommend RSV and tdap vaccine at the pharmacy    Recommend increased walking at the store prior to shopping    Encouraged to decrease weight by 10 pound. Recommendations for Preventive Services Due: see orders and patient instructions/AVS.  Recommended screening schedule for the next 5-10 years is provided to the patient in written form: see Patient Instructions/AVS.     Return in about 6 months (around 6/6/2024) for diabetes, hyperlipidemia, HTN. Subjective       Had stress test and will have upcoming echo. Now taking crestor 5 mg daily will follow up with cardiology 1/2024. Feels is tolerating crestor well. Patient's complete Health Risk Assessment and screening values have been reviewed and are found in Flowsheets. The following problems were reviewed today and where indicated follow up appointments were made and/or referrals ordered.     Positive Risk Factor Screenings with Interventions:                 Weight and Activity:  Physical Activity: Insufficiently Active (12/5/2023)    Exercise Vital Sign     Days of Exercise per Week: 3 days     Minutes of Exercise per Session: 10 min     On average, how many days per week do you engage in moderate to strenuous exercise (like a brisk walk)?: 3 days  Have you lost any weight without trying in the past 3 months?: No  Body mass

## 2023-12-28 ENCOUNTER — HOSPITAL ENCOUNTER (OUTPATIENT)
Dept: CARDIOLOGY | Age: 78
Discharge: HOME | End: 2023-12-28
Payer: COMMERCIAL

## 2023-12-28 DIAGNOSIS — R07.9 CHEST PAIN, UNSPECIFIED TYPE: ICD-10-CM

## 2023-12-28 PROCEDURE — C8929 TTE W OR WO FOL WCON,DOPPLER: HCPCS

## 2023-12-28 PROCEDURE — 93306 TTE W/DOPPLER COMPLETE: CPT

## 2023-12-28 PROCEDURE — 6360000004 HC RX CONTRAST MEDICATION: Performed by: STUDENT IN AN ORGANIZED HEALTH CARE EDUCATION/TRAINING PROGRAM

## 2023-12-28 RX ADMIN — PERFLUTREN 1.82 ML: 6.52 INJECTION, SUSPENSION INTRAVENOUS at 14:57

## 2023-12-29 ENCOUNTER — TELEPHONE (OUTPATIENT)
Dept: CARDIOLOGY CLINIC | Age: 78
End: 2023-12-29

## 2023-12-29 NOTE — TELEPHONE ENCOUNTER
----- Message from Jacqueline Ventura MD sent at 12/29/2023  1:58 PM EST -----  Size of aorta is stable on echo cardiogram does not need any intervention at this time.   ----- Message -----  From: Titus Arndt Incoming Cardiovascular Orders From [facility]  Sent: 12/29/2023   7:19 AM EST  To: Mae Cruz DO

## 2024-01-16 NOTE — PROGRESS NOTES
Lungs:   Clear to auscultation bilaterally, no wheezes, no rales, no respiratory distress   Chest Wall:  No deformity or tenderness to palpation   Heart:  Regular rate and rhythm, normal S1, normal S2, no murmur, no rub, no S3/S4, PMI non-displaced.    Abdomen:   Soft, non-tender, with normoactive bowel sounds. No masses, no hepatosplenomegaly   Extremities: No cyanosis, clubbing or pitting edema.    Vascular: 2+ radial. Brisk carotid upstrokes without carotid bruit.    Skin: Skin color, texture, turgor are normal with no rashes or ulceration.    Pysch: Euthymic mood, appropriate affect   Neurologic: Oriented to person, place and time. No slurred speech or facial asymmetry. No motor or sensory deficits on gross examination.         Labs:      Latest Reference Range & Units 09/18/23 12:04   Sodium 136 - 145 mmol/L 135 (L)   Potassium 3.5 - 5.1 mmol/L 3.5   Chloride 99 - 110 mmol/L 99   CO2 21 - 32 mmol/L 25   BUN,BUNPL 7 - 20 mg/dL 18   Creatinine 0.8 - 1.3 mg/dL 1.2   Anion Gap 3 - 16  11   Est, Glom Filt Rate >60  >60   Magnesium 1.80 - 2.40 mg/dL 1.90   Glucose, Random 70 - 99 mg/dL 173 (H)   CALCIUM, SERUM, 569022 8.3 - 10.6 mg/dL 9.7   Total Protein 6.4 - 8.2 g/dL 7.2      Latest Reference Range & Units 09/18/23 12:04   Albumin 3.4 - 5.0 g/dL 4.0   Albumin/Globulin Ratio 1.1 - 2.2  1.3   Alk Phos 40 - 129 U/L 64   ALT 10 - 40 U/L 42 (H)   AST 15 - 37 U/L 32   BILIRUBIN TOTAL 0.0 - 1.0 mg/dL 0.6   Total Protein 6.4 - 8.2 g/dL 7.2      Latest Reference Range & Units 09/18/23 12:04   WBC 4.0 - 11.0 K/uL 10.1   RBC 4.20 - 5.90 M/uL 4.92   Hemoglobin Quant 13.5 - 17.5 g/dL 16.0   Hematocrit 40.5 - 52.5 % 45.1   MCV 80.0 - 100.0 fL 91.7   MCH 26.0 - 34.0 pg 32.5   MCHC 31.0 - 36.0 g/dL 35.4   MPV 5.0 - 10.5 fL 7.8   RDW 12.4 - 15.4 % 13.7   Platelet Count 135 - 450 K/uL 277      [facility] Reference Range & Units 06/02/23 09:33   Cholesterol, Total 0 - 199 mg/dL 222 (H)   HDL Cholesterol 40 - 60 mg/dL 67 (H)   LDL

## 2024-01-22 DIAGNOSIS — E11.9 TYPE 2 DIABETES MELLITUS WITHOUT COMPLICATION, WITHOUT LONG-TERM CURRENT USE OF INSULIN (HCC): ICD-10-CM

## 2024-01-22 DIAGNOSIS — E11.59 HYPERTENSION ASSOCIATED WITH TYPE 2 DIABETES MELLITUS (HCC): ICD-10-CM

## 2024-01-22 DIAGNOSIS — E11.69 HYPERLIPIDEMIA DUE TO TYPE 2 DIABETES MELLITUS (HCC): ICD-10-CM

## 2024-01-22 DIAGNOSIS — I15.2 HYPERTENSION ASSOCIATED WITH TYPE 2 DIABETES MELLITUS (HCC): ICD-10-CM

## 2024-01-22 DIAGNOSIS — E78.5 HYPERLIPIDEMIA DUE TO TYPE 2 DIABETES MELLITUS (HCC): ICD-10-CM

## 2024-01-22 DIAGNOSIS — Z12.5 PROSTATE CANCER SCREENING: ICD-10-CM

## 2024-01-22 LAB
ALBUMIN SERPL-MCNC: 4.4 G/DL (ref 3.4–5)
ALBUMIN/GLOB SERPL: 1.6 {RATIO} (ref 1.1–2.2)
ALP SERPL-CCNC: 64 U/L (ref 40–129)
ALT SERPL-CCNC: 39 U/L (ref 10–40)
ANION GAP SERPL CALCULATED.3IONS-SCNC: 13 MMOL/L (ref 3–16)
AST SERPL-CCNC: 29 U/L (ref 15–37)
BILIRUB SERPL-MCNC: 0.4 MG/DL (ref 0–1)
BUN SERPL-MCNC: 16 MG/DL (ref 7–20)
CALCIUM SERPL-MCNC: 8.8 MG/DL (ref 8.3–10.6)
CHLORIDE SERPL-SCNC: 99 MMOL/L (ref 99–110)
CHOLEST SERPL-MCNC: 179 MG/DL (ref 0–199)
CO2 SERPL-SCNC: 27 MMOL/L (ref 21–32)
CREAT SERPL-MCNC: 1.1 MG/DL (ref 0.8–1.3)
GFR SERPLBLD CREATININE-BSD FMLA CKD-EPI: >60 ML/MIN/{1.73_M2}
GLUCOSE SERPL-MCNC: 123 MG/DL (ref 70–99)
HDLC SERPL-MCNC: 55 MG/DL (ref 40–60)
LDLC SERPL CALC-MCNC: 101 MG/DL
POTASSIUM SERPL-SCNC: 4.2 MMOL/L (ref 3.5–5.1)
PROT SERPL-MCNC: 7.2 G/DL (ref 6.4–8.2)
PSA SERPL DL<=0.01 NG/ML-MCNC: 0.81 NG/ML (ref 0–4)
SODIUM SERPL-SCNC: 139 MMOL/L (ref 136–145)
TRIGL SERPL-MCNC: 115 MG/DL (ref 0–150)
VLDLC SERPL CALC-MCNC: 23 MG/DL

## 2024-01-23 LAB
EST. AVERAGE GLUCOSE BLD GHB EST-MCNC: 116.9 MG/DL
HBA1C MFR BLD: 5.7 %

## 2024-01-30 ENCOUNTER — OFFICE VISIT (OUTPATIENT)
Dept: CARDIOLOGY CLINIC | Age: 79
End: 2024-01-30
Payer: COMMERCIAL

## 2024-01-30 ENCOUNTER — TELEPHONE (OUTPATIENT)
Dept: CARDIOLOGY CLINIC | Age: 79
End: 2024-01-30

## 2024-01-30 VITALS
OXYGEN SATURATION: 95 % | DIASTOLIC BLOOD PRESSURE: 72 MMHG | HEART RATE: 95 BPM | SYSTOLIC BLOOD PRESSURE: 126 MMHG | HEIGHT: 69 IN | WEIGHT: 232 LBS | BODY MASS INDEX: 34.36 KG/M2

## 2024-01-30 DIAGNOSIS — I71.21 ASCENDING AORTIC ANEURYSM, UNSPECIFIED WHETHER RUPTURED (HCC): Primary | ICD-10-CM

## 2024-01-30 DIAGNOSIS — E78.5 HYPERLIPIDEMIA, UNSPECIFIED HYPERLIPIDEMIA TYPE: ICD-10-CM

## 2024-01-30 DIAGNOSIS — E11.59 HYPERTENSION ASSOCIATED WITH TYPE 2 DIABETES MELLITUS (HCC): ICD-10-CM

## 2024-01-30 DIAGNOSIS — I15.2 HYPERTENSION ASSOCIATED WITH TYPE 2 DIABETES MELLITUS (HCC): ICD-10-CM

## 2024-01-30 PROBLEM — R07.9 CHEST PAIN: Status: RESOLVED | Noted: 2023-09-18 | Resolved: 2024-01-30

## 2024-01-30 PROCEDURE — 99213 OFFICE O/P EST LOW 20 MIN: CPT | Performed by: STUDENT IN AN ORGANIZED HEALTH CARE EDUCATION/TRAINING PROGRAM

## 2024-01-30 PROCEDURE — 3074F SYST BP LT 130 MM HG: CPT | Performed by: STUDENT IN AN ORGANIZED HEALTH CARE EDUCATION/TRAINING PROGRAM

## 2024-01-30 PROCEDURE — 3078F DIAST BP <80 MM HG: CPT | Performed by: STUDENT IN AN ORGANIZED HEALTH CARE EDUCATION/TRAINING PROGRAM

## 2024-01-30 PROCEDURE — 3044F HG A1C LEVEL LT 7.0%: CPT | Performed by: STUDENT IN AN ORGANIZED HEALTH CARE EDUCATION/TRAINING PROGRAM

## 2024-01-30 PROCEDURE — 1123F ACP DISCUSS/DSCN MKR DOCD: CPT | Performed by: STUDENT IN AN ORGANIZED HEALTH CARE EDUCATION/TRAINING PROGRAM

## 2024-01-30 RX ORDER — ASPIRIN 81 MG/1
81 TABLET ORAL DAILY
Qty: 90 TABLET | Refills: 1
Start: 2024-01-30

## 2024-01-30 RX ORDER — ROSUVASTATIN CALCIUM 5 MG/1
5 TABLET, COATED ORAL DAILY
Qty: 90 TABLET | Refills: 3 | Status: SHIPPED | OUTPATIENT
Start: 2024-01-30 | End: 2024-01-30

## 2024-01-30 RX ORDER — ROSUVASTATIN CALCIUM 5 MG/1
5 TABLET, COATED ORAL
Qty: 90 TABLET | Refills: 3 | Status: SHIPPED | OUTPATIENT
Start: 2024-01-31 | End: 2024-01-30

## 2024-01-30 RX ORDER — ROSUVASTATIN CALCIUM 5 MG/1
TABLET, COATED ORAL
Qty: 60 TABLET | Refills: 3 | Status: SHIPPED | OUTPATIENT
Start: 2024-01-30

## 2024-01-30 NOTE — PATIENT INSTRUCTIONS
Plan  Ordered CT Chest to assess the aneurysmal dilation of the ascending aorta.  We will call you with the results.   Okay to continue taking Aspirin.  Lipid panel in three months    Follow up with me in one year.      Your provider has ordered testing for further evaluation.  An order/prescription has been included in your paper work.   To schedule outpatient testing, contact Central Scheduling by calling [address] [place] [address] ([phone]).

## 2024-01-30 NOTE — TELEPHONE ENCOUNTER
Called [facility].  Per Dr Rae's note today.  Patient almost at goal LDL <100. Will increase crestor 5mg to 5 days per week (ie. M-F) after discussion with patient and given his history of statin intolerance. Low and slow up titration as needed and tolerated.     Informed pharmacy and VU.

## 2024-01-30 NOTE — TELEPHONE ENCOUNTER
Medication Question/Concern    What is the name of the medication you need to speak with someone about?Rosuvastatin    What issues/concerns are you having with this medication:    Pharmacy called and stated they received 3 scripts of Rosuvastatin with 3 sets of instructions and 3 different doses. Pharmacy wants to clarify which  one they should fill for pt. Please advise. [facility]acy: [facility] ph#[phone]

## 2024-02-08 ENCOUNTER — TELEPHONE (OUTPATIENT)
Dept: CARDIOLOGY CLINIC | Age: 79
End: 2024-02-08

## 2024-02-08 ENCOUNTER — HOSPITAL ENCOUNTER (OUTPATIENT)
Dept: CT IMAGING | Age: 79
Discharge: HOME | End: 2024-02-08
Attending: STUDENT IN AN ORGANIZED HEALTH CARE EDUCATION/TRAINING PROGRAM
Payer: COMMERCIAL

## 2024-02-08 DIAGNOSIS — I71.21 ASCENDING AORTIC ANEURYSM, UNSPECIFIED WHETHER RUPTURED (HCC): ICD-10-CM

## 2024-02-08 PROCEDURE — 6360000004 HC RX CONTRAST MEDICATION: Performed by: STUDENT IN AN ORGANIZED HEALTH CARE EDUCATION/TRAINING PROGRAM

## 2024-02-08 PROCEDURE — 71275 CT ANGIOGRAPHY CHEST: CPT

## 2024-02-08 RX ADMIN — IOPAMIDOL 75 ML: 755 INJECTION, SOLUTION INTRAVENOUS at 07:09

## 2024-02-08 NOTE — TELEPHONE ENCOUNTER
----- Message from Kayode Rae, DO sent at 2/8/2024 12:28 PM EST -----  Please call patient and let him know that his aorta is mildly dilated at 4.1cm (similar to echo) and similar in size to CT report from 2018. Stable. This is good news. We can follow serially with echocardiograms going forward. Thanks. AMP

## 2024-03-28 PROBLEM — K76.0 FATTY LIVER: Status: ACTIVE | Noted: 2024-03-28

## 2024-04-19 DIAGNOSIS — E78.2 MIXED HYPERLIPIDEMIA: ICD-10-CM

## 2024-04-19 RX ORDER — LOSARTAN POTASSIUM AND HYDROCHLOROTHIAZIDE 12.5; 5 MG/1; MG/1
TABLET ORAL
Qty: 90 TABLET | Refills: 1 | Status: SHIPPED | OUTPATIENT
Start: 2024-04-19

## 2024-04-19 NOTE — TELEPHONE ENCOUNTER
Refill Request     CONFIRM preferred pharmacy with the patient.    If Mail Order Rx - Pend for 90 day refill.      Last Seen: Last Seen Department: 12/6/2023  Last Seen by PCP: Visit date not found    Last Written: 10/27/2023    If no future appointment scheduled:  Review the last OV with PCP and review information for follow-up visit,  Route STAFF MESSAGE with patient name to the  Pool for scheduling with the following information:            -  Timing of next visit           -  Visit type ie Physical, OV, etc           -  Diagnoses/Reason ie. COPD, HTN - Do not use MEDICATION, Follow-up or CHECK UP - Give reason for visit      Next Appointment:   Future Appointments   Date Time Provider Department Center   6/14/2024  9:00 AM Giana Richards APRN - CNP [facility]  [place] - [place]       Message sent to  to schedule appt with patient?  NO      Requested Prescriptions     Pending Prescriptions Disp Refills    losartan-hydroCHLOROthiazide (HYZAAR) 50-12.5 MG per tablet [Pharmacy Med Name: LOSARTAN-HCTZ 50-12.5 MG TAB] 90 tablet 1     Sig: TAKE 1 TABLET BY MOUTH EVERY DAY

## 2024-04-22 DIAGNOSIS — M54.50 ACUTE MIDLINE LOW BACK PAIN WITHOUT SCIATICA: ICD-10-CM

## 2024-04-22 DIAGNOSIS — I10 ESSENTIAL HYPERTENSION: ICD-10-CM

## 2024-04-22 RX ORDER — METHOCARBAMOL 750 MG/1
750 TABLET, FILM COATED ORAL 3 TIMES DAILY PRN
Qty: 30 TABLET | Refills: 1 | Status: SHIPPED | OUTPATIENT
Start: 2024-04-22

## 2024-04-22 RX ORDER — NIFEDIPINE 90 MG/1
90 TABLET, FILM COATED, EXTENDED RELEASE ORAL DAILY
Qty: 90 TABLET | Refills: 3 | Status: SHIPPED | OUTPATIENT
Start: 2024-04-22

## 2024-04-22 NOTE — TELEPHONE ENCOUNTER
Refill Request     CONFIRM preferred pharmacy with the patient.    If Mail Order Rx - Pend for 90 day refill.      Last Seen: Last Seen Department: 12/6/2023  Last Seen by PCP: 12/6/2023    Last Written: Methocarbamol 12/20/23 30 tabs 1 refill     Nifedipine 5/11/23 90 tabs 3 refills     If no future appointment scheduled:  Review the last OV with PCP and review information for follow-up visit,  Route STAFF MESSAGE with patient name to the  Pool for scheduling with the following information:            -  Timing of next visit           -  Visit type ie Physical, OV, etc           -  Diagnoses/Reason ie. COPD, HTN - Do not use MEDICATION, Follow-up or CHECK UP - Give reason for visit      Next Appointment:   Future Appointments   Date Time Provider Department Center   6/14/2024  9:00 AM Giana Richards APRN - CNP [facility]  [place] - [facility]       Message sent to  to schedule appt with patient?  NO      Requested Prescriptions     Pending Prescriptions Disp Refills    methocarbamol (ROBAXIN) 750 MG tablet 30 tablet 1     Sig: Take 1 tablet by mouth 3 times daily as needed (back pain)

## 2024-04-29 DIAGNOSIS — E78.5 HYPERLIPIDEMIA, UNSPECIFIED HYPERLIPIDEMIA TYPE: ICD-10-CM

## 2024-04-29 LAB
CHOLEST SERPL-MCNC: 171 MG/DL (ref 0–199)
HDLC SERPL-MCNC: 63 MG/DL (ref 40–60)
LDL CHOLESTEROL CALCULATED: 82 MG/DL
TRIGL SERPL-MCNC: 131 MG/DL (ref 0–150)
VLDLC SERPL CALC-MCNC: 26 MG/DL

## 2024-05-01 ENCOUNTER — TELEPHONE (OUTPATIENT)
Dept: CARDIOLOGY CLINIC | Age: 79
End: 2024-05-01

## 2024-05-01 NOTE — TELEPHONE ENCOUNTER
----- Message from Kayode Rae DO sent at 5/1/2024  2:22 PM EDT -----  Please let Mr. Hung know that his cholesterol panel looks good. His LDL is now below 100. Continue crestor 5mg M-F or however he has been taking, its working.

## 2024-05-02 NOTE — TELEPHONE ENCOUNTER
Called and spoke with pt regarding result, he v/u   Initiate Treatment: imiquimod 5 % topical cream packet \\nQuantity: 24.0 Packet  Days Supply: 30\\nSig: Apply to affected areas every other night for 2 months Detail Level: Detailed Render In Strict Bullet Format?: No Plan: Follow up in 2 months for reevaluation

## 2024-06-13 SDOH — HEALTH STABILITY: PHYSICAL HEALTH: ON AVERAGE, HOW MANY MINUTES DO YOU ENGAGE IN EXERCISE AT THIS LEVEL?: 10 MIN

## 2024-06-13 SDOH — HEALTH STABILITY: PHYSICAL HEALTH: ON AVERAGE, HOW MANY DAYS PER WEEK DO YOU ENGAGE IN MODERATE TO STRENUOUS EXERCISE (LIKE A BRISK WALK)?: 5 DAYS

## 2024-06-13 ASSESSMENT — LIFESTYLE VARIABLES
HOW MANY STANDARD DRINKS CONTAINING ALCOHOL DO YOU HAVE ON A TYPICAL DAY: 1
HOW OFTEN DURING THE LAST YEAR HAVE YOU NEEDED AN ALCOHOLIC DRINK FIRST THING IN THE MORNING TO GET YOURSELF GOING AFTER A NIGHT OF HEAVY DRINKING: NEVER
HAS A RELATIVE, FRIEND, DOCTOR, OR ANOTHER HEALTH PROFESSIONAL EXPRESSED CONCERN ABOUT YOUR DRINKING OR SUGGESTED YOU CUT DOWN: NO
HOW OFTEN DURING THE LAST YEAR HAVE YOU FAILED TO DO WHAT WAS NORMALLY EXPECTED FROM YOU BECAUSE OF DRINKING: NEVER
HAS A RELATIVE, FRIEND, DOCTOR, OR ANOTHER HEALTH PROFESSIONAL EXPRESSED CONCERN ABOUT YOUR DRINKING OR SUGGESTED YOU CUT DOWN: NO
HOW OFTEN DURING THE LAST YEAR HAVE YOU FAILED TO DO WHAT WAS NORMALLY EXPECTED FROM YOU BECAUSE OF DRINKING: NEVER
HOW OFTEN DURING THE LAST YEAR HAVE YOU HAD A FEELING OF GUILT OR REMORSE AFTER DRINKING: NEVER
HOW OFTEN DURING THE LAST YEAR HAVE YOU HAD A FEELING OF GUILT OR REMORSE AFTER DRINKING: NEVER
HOW OFTEN DO YOU HAVE SIX OR MORE DRINKS ON ONE OCCASION: 1
HOW OFTEN DO YOU HAVE A DRINK CONTAINING ALCOHOL: 5
HOW OFTEN DURING THE LAST YEAR HAVE YOU BEEN UNABLE TO REMEMBER WHAT HAPPENED THE NIGHT BEFORE BECAUSE YOU HAD BEEN DRINKING: NEVER
HOW OFTEN DURING THE LAST YEAR HAVE YOU NEEDED AN ALCOHOLIC DRINK FIRST THING IN THE MORNING TO GET YOURSELF GOING AFTER A NIGHT OF HEAVY DRINKING: NEVER
HAVE YOU OR SOMEONE ELSE BEEN INJURED AS A RESULT OF YOUR DRINKING: NO
HAVE YOU OR SOMEONE ELSE BEEN INJURED AS A RESULT OF YOUR DRINKING: NO
HOW OFTEN DURING THE LAST YEAR HAVE YOU FOUND THAT YOU WERE NOT ABLE TO STOP DRINKING ONCE YOU HAD STARTED: NEVER
HOW OFTEN DURING THE LAST YEAR HAVE YOU FOUND THAT YOU WERE NOT ABLE TO STOP DRINKING ONCE YOU HAD STARTED: NEVER
HOW OFTEN DO YOU HAVE A DRINK CONTAINING ALCOHOL: 4 OR MORE TIMES A WEEK
HOW MANY STANDARD DRINKS CONTAINING ALCOHOL DO YOU HAVE ON A TYPICAL DAY: 1 OR 2
HOW OFTEN DURING THE LAST YEAR HAVE YOU BEEN UNABLE TO REMEMBER WHAT HAPPENED THE NIGHT BEFORE BECAUSE YOU HAD BEEN DRINKING: NEVER

## 2024-06-13 ASSESSMENT — PATIENT HEALTH QUESTIONNAIRE - PHQ9
SUM OF ALL RESPONSES TO PHQ9 QUESTIONS 1 & 2: 0
SUM OF ALL RESPONSES TO PHQ QUESTIONS 1-9: 0
1. LITTLE INTEREST OR PLEASURE IN DOING THINGS: NOT AT ALL
SUM OF ALL RESPONSES TO PHQ QUESTIONS 1-9: 0
2. FEELING DOWN, DEPRESSED OR HOPELESS: NOT AT ALL
SUM OF ALL RESPONSES TO PHQ QUESTIONS 1-9: 0
SUM OF ALL RESPONSES TO PHQ QUESTIONS 1-9: 0

## 2024-06-14 ENCOUNTER — OFFICE VISIT (OUTPATIENT)
Dept: FAMILY MEDICINE CLINIC | Age: 79
End: 2024-06-14

## 2024-06-14 VITALS
DIASTOLIC BLOOD PRESSURE: 64 MMHG | RESPIRATION RATE: 18 BRPM | OXYGEN SATURATION: 97 % | BODY MASS INDEX: 33.62 KG/M2 | HEIGHT: 69 IN | WEIGHT: 227 LBS | HEART RATE: 91 BPM | SYSTOLIC BLOOD PRESSURE: 102 MMHG

## 2024-06-14 DIAGNOSIS — Z00.00 MEDICARE ANNUAL WELLNESS VISIT, SUBSEQUENT: ICD-10-CM

## 2024-06-14 DIAGNOSIS — L30.9 DERMATITIS: ICD-10-CM

## 2024-06-14 DIAGNOSIS — E11.9 TYPE 2 DIABETES MELLITUS WITHOUT COMPLICATION, WITHOUT LONG-TERM CURRENT USE OF INSULIN (HCC): Primary | ICD-10-CM

## 2024-06-14 LAB — HBA1C MFR BLD: 6 %

## 2024-06-14 RX ORDER — TRIAMCINOLONE ACETONIDE 1 MG/G
CREAM TOPICAL
Qty: 30 G | Refills: 1 | Status: SHIPPED | OUTPATIENT
Start: 2024-06-14

## 2024-06-14 NOTE — PROGRESS NOTES
Apply topically as needed for Itching [facility] brand  Yes Nicholas Malhotra MD   Multiple Vitamins-Minerals (THERAPEUTIC MULTIVITAMIN-MINERALS) tablet Take 1 tablet by mouth daily Yes Nicholas Malhotra MD   ibuprofen (ADVIL;MOTRIN) 200 MG tablet Take 1 tablet by mouth every 6 hours as needed for Pain Yes Nicholas Malhotra MD   Omega-3 Fatty Acids (FISH OIL) 600 MG CAPS Take by mouth Yes Nicholas Malhotra MD   vitamin C (ASCORBIC ACID) 500 MG tablet Take 1 tablet by mouth daily Yes Nicholas Malhotra MD   Calcium Carb-Cholecalciferol (CALCIUM/VITAMIN D) 600-400 MG-UNIT TABS Take by mouth Yes Nicholas Malhotra MD   TURMERIC PO Take 2,700 mg by mouth daily  Yes Nicholas Malhotra MD   Probiotic Product (SOLUBLE FIBER/PROBIOTICS PO) Take 1 capsule by mouth daily. Yes Nicholas Malhotra MD   fexofenadine (ALLEGRA) 180 MG tablet Take 1 tablet by mouth daily Yes Nicholas Malhotra MD   POTASSIUM GLUCONATE Take 1 tablet by mouth daily. Yes Nicholas Malhotra MD   Multiple Vitamins-Minerals (VISION VITAMINS PO) Take 1 capsule by mouth daily.   Yes ProviderNicholas MD       CareTeam (Including outside providers/suppliers regularly involved in providing care):   Patient Care Team:  Giana Richards APRN - CNP as PCP - General (Nurse Practitioner)  Giana Richards APRN - CNP as PCP - Empaneled Provider  Dilma Raphael DO as Consulting Physician (Orthopedic Surgery)  Tariq Velazquez MD (Internal Medicine)     Reviewed and updated this visit:  Tobacco  Allergies  Meds  Problems  Med Hx  Surg Hx  Soc Hx  Fam Hx

## 2024-10-03 DIAGNOSIS — M54.50 ACUTE MIDLINE LOW BACK PAIN WITHOUT SCIATICA: ICD-10-CM

## 2024-10-03 RX ORDER — METHOCARBAMOL 750 MG/1
750 TABLET, FILM COATED ORAL 3 TIMES DAILY PRN
Qty: 30 TABLET | Refills: 1 | Status: SHIPPED | OUTPATIENT
Start: 2024-10-03

## 2024-10-03 NOTE — TELEPHONE ENCOUNTER
Refill Request     CONFIRM preferred pharmacy with the patient.    If Mail Order Rx - Pend for 90 day refill.      Last Seen: Last Seen Department: 6/14/2024  Last Seen by PCP: 6/14/2024    Last Written: 4/22/24 39 with 1 refill     If no future appointment scheduled:  Review the last OV with PCP and review information for follow-up visit,  Route STAFF MESSAGE with patient name to the  Pool for scheduling with the following information:            -  Timing of next visit           -  Visit type ie Physical, OV, etc           -  Diagnoses/Reason ie. COPD, HTN - Do not use MEDICATION, Follow-up or CHECK UP - Give reason for visit      Next Appointment:   Future Appointments   Date Time Provider Department Center   12/19/2024  8:00 AM Giana Richards, JAY JAY - CNP [facility] DEP       Message sent to  to schedule appt with patient?  NO      Requested Prescriptions     Pending Prescriptions Disp Refills    methocarbamol (ROBAXIN) 750 MG tablet [Pharmacy Med Name: METHOCARBAMOL 750 MG TABLET] 30 tablet 1     Sig: TAKE 1 TABLET BY MOUTH 3 TIMES DAILY AS NEEDED (BACK PAIN)

## 2024-10-15 DIAGNOSIS — E78.2 MIXED HYPERLIPIDEMIA: ICD-10-CM

## 2024-10-15 NOTE — TELEPHONE ENCOUNTER
Refill Request     CONFIRM preferred pharmacy with the patient.    If Mail Order Rx - Pend for 90 day refill.      Last Seen: Last Seen Department: 6/14/2024  Last Seen by PCP: 6/14/2024    Last Written: 04/19/2024 90 tab 1 refills     If no future appointment scheduled:  Review the last OV with PCP and review information for follow-up visit,  Route STAFF MESSAGE with patient name to the  Pool for scheduling with the following information:            -  Timing of next visit           -  Visit type ie Physical, OV, etc           -  Diagnoses/Reason ie. COPD, HTN - Do not use MEDICATION, Follow-up or CHECK UP - Give reason for visit      Next Appointment:   Future Appointments   Date Time Provider Department Center   12/19/2024  8:00 AM Giana Richards APRN - CNP [facility]   1/29/2025 12:00 PM Kayode Rae DO [facility]       Message sent to  to schedule appt with patient?  NO      Requested Prescriptions     Pending Prescriptions Disp Refills    losartan-hydroCHLOROthiazide (HYZAAR) 50-12.5 MG per tablet [Pharmacy Med Name: LOSARTAN-HCTZ 50-12.5 MG TAB] 90 tablet 1     Sig: TAKE 1 TABLET BY MOUTH EVERY DAY

## 2024-10-16 RX ORDER — LOSARTAN POTASSIUM AND HYDROCHLOROTHIAZIDE 12.5; 5 MG/1; MG/1
TABLET ORAL
Qty: 90 TABLET | Refills: 3 | Status: SHIPPED | OUTPATIENT
Start: 2024-10-16

## 2024-11-12 ENCOUNTER — TELEPHONE (OUTPATIENT)
Dept: CARDIOLOGY CLINIC | Age: 79
End: 2024-11-12

## 2024-11-23 SDOH — HEALTH STABILITY: PHYSICAL HEALTH: ON AVERAGE, HOW MANY MINUTES DO YOU ENGAGE IN EXERCISE AT THIS LEVEL?: 10 MIN

## 2024-11-23 SDOH — HEALTH STABILITY: PHYSICAL HEALTH: ON AVERAGE, HOW MANY DAYS PER WEEK DO YOU ENGAGE IN MODERATE TO STRENUOUS EXERCISE (LIKE A BRISK WALK)?: 5 DAYS

## 2024-11-25 ENCOUNTER — OFFICE VISIT (OUTPATIENT)
Dept: ORTHOPEDIC SURGERY | Age: 79
End: 2024-11-25
Payer: COMMERCIAL

## 2024-11-25 VITALS — HEIGHT: 69 IN | WEIGHT: 227 LBS | BODY MASS INDEX: 33.62 KG/M2

## 2024-11-25 DIAGNOSIS — M25.561 PAIN IN BOTH KNEES, UNSPECIFIED CHRONICITY: ICD-10-CM

## 2024-11-25 DIAGNOSIS — M17.12 PRIMARY OSTEOARTHRITIS OF LEFT KNEE: Primary | ICD-10-CM

## 2024-11-25 DIAGNOSIS — M25.562 PAIN IN BOTH KNEES, UNSPECIFIED CHRONICITY: ICD-10-CM

## 2024-11-25 PROCEDURE — 1123F ACP DISCUSS/DSCN MKR DOCD: CPT | Performed by: ORTHOPAEDIC SURGERY

## 2024-11-25 PROCEDURE — 1159F MED LIST DOCD IN RCRD: CPT | Performed by: ORTHOPAEDIC SURGERY

## 2024-11-25 PROCEDURE — 99204 OFFICE O/P NEW MOD 45 MIN: CPT | Performed by: ORTHOPAEDIC SURGERY

## 2024-11-25 RX ORDER — MELOXICAM 15 MG/1
15 TABLET ORAL DAILY PRN
Qty: 30 TABLET | Refills: 0 | Status: SHIPPED | OUTPATIENT
Start: 2024-11-25

## 2024-11-25 NOTE — PROGRESS NOTES
mild to moderate with joint space narrowing and osteophytes marginal based    Pertinent lab work: None     Diagnosis Orders   1. Primary osteoarthritis of left knee        2. Pain in both knees, unspecified chronicity  XR KNEE LEFT (MIN 4 VIEWS)        Assessment and plan: 79 y.o. male with current subjective symptoms and physical exam findings with diagnostic imaging correlating to acute on chronic exacerbation of the left knee osteoarthritis.  -Time of 23 minutes was spent coordinating and discussing the clinical findings, reviewing diagnostic imaging as indicated, coordinating care with prior notes review and current clinical encounter documentation as it pertains to the patient's presenting subjective symptoms and diagnoses.  -I reviewed with the patient the imaging findings as well as clinical exam and  how it correlates to subjective symptoms.  --I had a pleasant discussion with the patient today and reviewed with the patient nonoperative versus operative measures.  Understand the risk and benefits of nonoperative versus operative care with regard to risks and benefits associated with each with the above listed diagnoses, currently the patient wishes to proceed with conservative nonoperative care at this time as he has had a dramatic improvement in his baseline symptoms  -Mobic 15 mg p.o. daily as needed pain and OTC Tylenol per bottle as needed discomfort   -Physician directed physical therapy has been prescribed to work on periknee reconditioning and strengthening to include pain modalities  -I recommended activity modification to include low impact activity elliptical stationary bike walking and swimming  -Should the patient have a return of pain consideration for corticosteroid injection however we are both in agreement given his near complete resolution of symptoms with over the weekend triage that this is not indicated at this time  -All questions answered to the patient's satisfaction and the patient

## 2024-12-18 SDOH — ECONOMIC STABILITY: FOOD INSECURITY: WITHIN THE PAST 12 MONTHS, THE FOOD YOU BOUGHT JUST DIDN'T LAST AND YOU DIDN'T HAVE MONEY TO GET MORE.: NEVER TRUE

## 2024-12-18 SDOH — ECONOMIC STABILITY: FOOD INSECURITY: WITHIN THE PAST 12 MONTHS, YOU WORRIED THAT YOUR FOOD WOULD RUN OUT BEFORE YOU GOT MONEY TO BUY MORE.: NEVER TRUE

## 2024-12-18 SDOH — ECONOMIC STABILITY: INCOME INSECURITY: HOW HARD IS IT FOR YOU TO PAY FOR THE VERY BASICS LIKE FOOD, HOUSING, MEDICAL CARE, AND HEATING?: NOT HARD AT ALL

## 2024-12-19 ENCOUNTER — OFFICE VISIT (OUTPATIENT)
Dept: FAMILY MEDICINE CLINIC | Age: 79
End: 2024-12-19
Payer: COMMERCIAL

## 2024-12-19 VITALS
BODY MASS INDEX: 33.23 KG/M2 | SYSTOLIC BLOOD PRESSURE: 118 MMHG | OXYGEN SATURATION: 96 % | RESPIRATION RATE: 18 BRPM | WEIGHT: 225 LBS | HEART RATE: 88 BPM | DIASTOLIC BLOOD PRESSURE: 78 MMHG

## 2024-12-19 DIAGNOSIS — D64.9 ANEMIA, UNSPECIFIED TYPE: ICD-10-CM

## 2024-12-19 DIAGNOSIS — E11.9 TYPE 2 DIABETES MELLITUS WITHOUT COMPLICATION, WITHOUT LONG-TERM CURRENT USE OF INSULIN (HCC): Primary | ICD-10-CM

## 2024-12-19 DIAGNOSIS — E11.59 HYPERTENSION ASSOCIATED WITH TYPE 2 DIABETES MELLITUS (HCC): ICD-10-CM

## 2024-12-19 DIAGNOSIS — E78.2 MIXED HYPERLIPIDEMIA: ICD-10-CM

## 2024-12-19 DIAGNOSIS — Z12.5 PROSTATE CANCER SCREENING: ICD-10-CM

## 2024-12-19 DIAGNOSIS — I15.2 HYPERTENSION ASSOCIATED WITH TYPE 2 DIABETES MELLITUS (HCC): ICD-10-CM

## 2024-12-19 DIAGNOSIS — E11.69 HYPERLIPIDEMIA DUE TO TYPE 2 DIABETES MELLITUS (HCC): ICD-10-CM

## 2024-12-19 DIAGNOSIS — E78.5 HYPERLIPIDEMIA DUE TO TYPE 2 DIABETES MELLITUS (HCC): ICD-10-CM

## 2024-12-19 LAB — HBA1C MFR BLD: 5.9 %

## 2024-12-19 PROCEDURE — 1123F ACP DISCUSS/DSCN MKR DOCD: CPT | Performed by: NURSE PRACTITIONER

## 2024-12-19 PROCEDURE — 3044F HG A1C LEVEL LT 7.0%: CPT | Performed by: NURSE PRACTITIONER

## 2024-12-19 PROCEDURE — 83036 HEMOGLOBIN GLYCOSYLATED A1C: CPT | Performed by: NURSE PRACTITIONER

## 2024-12-19 PROCEDURE — 1160F RVW MEDS BY RX/DR IN RCRD: CPT | Performed by: NURSE PRACTITIONER

## 2024-12-19 PROCEDURE — 99214 OFFICE O/P EST MOD 30 MIN: CPT | Performed by: NURSE PRACTITIONER

## 2024-12-19 PROCEDURE — 3074F SYST BP LT 130 MM HG: CPT | Performed by: NURSE PRACTITIONER

## 2024-12-19 PROCEDURE — 3078F DIAST BP <80 MM HG: CPT | Performed by: NURSE PRACTITIONER

## 2024-12-19 PROCEDURE — 1159F MED LIST DOCD IN RCRD: CPT | Performed by: NURSE PRACTITIONER

## 2024-12-19 NOTE — ASSESSMENT & PLAN NOTE
Chronic, at goal (stable), continue current treatment plan    Orders:    Lipid Panel; Future    Comprehensive Metabolic Panel; Future    Following with cardiology 1/2025

## 2024-12-19 NOTE — ASSESSMENT & PLAN NOTE
Chronic, at goal (stable), continue current treatment plan    Orders:    POCT glycosylated hemoglobin (Hb A1C)    Albumin/Creatinine Ratio, Urine      Hemoglobin A1C   Date Value Ref Range Status   12/19/2024 5.9 % Final

## 2024-12-19 NOTE — PROGRESS NOTES
Billy Hung (:  1945) is a 79 y.o. male,Established patient, here for evaluation of the following chief complaint(s):  Diabetes         Assessment & Plan  Type 2 diabetes mellitus without complication, without long-term current use of insulin (HCC)   Chronic, at goal (stable), continue current treatment plan    Orders:    POCT glycosylated hemoglobin (Hb A1C)    Albumin/Creatinine Ratio, Urine      Hemoglobin A1C   Date Value Ref Range Status   2024 5.9 % Final       Mixed hyperlipidemia   Chronic, at goal (stable), continue current treatment plan    Orders:    Lipid Panel; Future    Comprehensive Metabolic Panel; Future    Following with cardiology 2025  Hypertension associated with type 2 diabetes mellitus (HCC)   Chronic, at goal (stable), continue current treatment plan    Orders:    Comprehensive Metabolic Panel; Future    Prostate cancer screening    Labs ordered    Orders:    PSA Screening; Future    Anemia, unspecified type   Chronic, at goal (stable), labs ordered to assess the need for ongoing iron supplement.     Orders:    CBC with Auto Differential; Future    Iron and TIBC; Future    Hyperlipidemia due to type 2 diabetes mellitus (HCC)       Orders:    Lipid Panel; Future    Comprehensive Metabolic Panel; Future      Return in about 6 months (around 2025) for AWV.       Subjective   HPI    For routine follow up DM.     Continue to work out and volunteer work for activity. Weight down 2 pounds over the past 6 months.     2024 had sudden flair of pain left knee and seen by Dr. Osorio and px meloxicam. Warned by pharmacist that the meloxicam could elevate his blood pressure and when he checked it at home it was higher.     Following with Dr. MANJARREZ for GI and seen in August.     Review of Systems   All other systems reviewed and are negative.         Objective   Physical Exam  Constitutional:       General: He is not in acute distress.     Appearance: Normal appearance. He is

## 2024-12-20 LAB
CREAT UR-MCNC: 140 MG/DL (ref 39–259)
MICROALBUMIN UR DL<=1MG/L-MCNC: <1.2 MG/DL
MICROALBUMIN/CREAT UR: NORMAL MG/G (ref 0–30)

## 2024-12-22 DIAGNOSIS — E78.5 HYPERLIPIDEMIA, UNSPECIFIED HYPERLIPIDEMIA TYPE: ICD-10-CM

## 2024-12-23 RX ORDER — ROSUVASTATIN CALCIUM 5 MG/1
TABLET, COATED ORAL
Qty: 60 TABLET | Refills: 0 | Status: SHIPPED | OUTPATIENT
Start: 2024-12-23

## 2024-12-23 NOTE — TELEPHONE ENCOUNTER
Last Office Visit: 1/30/2024 Provider: AMP  **Is provider OOT? No    Next Office Visit: 1/29/25 Provider: AMP    LAST LABS:   Liver:   Lab Results   Component Value Date    ALT 39 01/22/2024    AST 29 01/22/2024    ALKPHOS 64 01/22/2024    BILITOT 0.4 01/22/2024      Lipid:   Lab Results   Component Value Date    CHOL 179 01/22/2024    TRIG 115 01/22/2024    HDL 63 (H) 04/29/2024    LDL 82 04/29/2024    VLDL 26 04/29/2024

## 2024-12-27 RX ORDER — MELOXICAM 15 MG/1
15 TABLET ORAL PRN
Qty: 30 TABLET | Refills: 0 | Status: SHIPPED | OUTPATIENT
Start: 2024-12-27

## 2025-01-24 DIAGNOSIS — E78.2 MIXED HYPERLIPIDEMIA: ICD-10-CM

## 2025-01-24 DIAGNOSIS — E78.5 HYPERLIPIDEMIA DUE TO TYPE 2 DIABETES MELLITUS (HCC): ICD-10-CM

## 2025-01-24 DIAGNOSIS — D64.9 ANEMIA, UNSPECIFIED TYPE: ICD-10-CM

## 2025-01-24 DIAGNOSIS — E11.59 HYPERTENSION ASSOCIATED WITH TYPE 2 DIABETES MELLITUS (HCC): ICD-10-CM

## 2025-01-24 DIAGNOSIS — Z12.5 PROSTATE CANCER SCREENING: ICD-10-CM

## 2025-01-24 DIAGNOSIS — I15.2 HYPERTENSION ASSOCIATED WITH TYPE 2 DIABETES MELLITUS (HCC): ICD-10-CM

## 2025-01-24 DIAGNOSIS — E11.69 HYPERLIPIDEMIA DUE TO TYPE 2 DIABETES MELLITUS (HCC): ICD-10-CM

## 2025-01-24 LAB
ALBUMIN SERPL-MCNC: 4.4 G/DL (ref 3.4–5)
ALBUMIN/GLOB SERPL: 1.5 {RATIO} (ref 1.1–2.2)
ALP SERPL-CCNC: 64 U/L (ref 40–129)
ALT SERPL-CCNC: 33 U/L (ref 10–40)
ANION GAP SERPL CALCULATED.3IONS-SCNC: 14 MMOL/L (ref 3–16)
AST SERPL-CCNC: 28 U/L (ref 15–37)
BASOPHILS # BLD: 0.1 K/UL (ref 0–0.2)
BASOPHILS NFR BLD: 0.7 %
BILIRUB SERPL-MCNC: 0.5 MG/DL (ref 0–1)
BUN SERPL-MCNC: 18 MG/DL (ref 7–20)
CALCIUM SERPL-MCNC: 9.9 MG/DL (ref 8.3–10.6)
CHLORIDE SERPL-SCNC: 103 MMOL/L (ref 99–110)
CHOLEST SERPL-MCNC: 171 MG/DL (ref 0–199)
CO2 SERPL-SCNC: 25 MMOL/L (ref 21–32)
CREAT SERPL-MCNC: 1.1 MG/DL (ref 0.8–1.3)
DEPRECATED RDW RBC AUTO: 13.5 % (ref 12.4–15.4)
EOSINOPHIL # BLD: 0.4 K/UL (ref 0–0.6)
EOSINOPHIL NFR BLD: 4.6 %
GFR SERPLBLD CREATININE-BSD FMLA CKD-EPI: 69 ML/MIN/{1.73_M2}
GLUCOSE SERPL-MCNC: 121 MG/DL (ref 70–99)
HCT VFR BLD AUTO: 49 % (ref 40.5–52.5)
HDLC SERPL-MCNC: 63 MG/DL (ref 40–60)
HGB BLD-MCNC: 16.8 G/DL (ref 13.5–17.5)
IRON SATN MFR SERPL: 23 % (ref 20–50)
IRON SERPL-MCNC: 75 UG/DL (ref 59–158)
LDLC SERPL CALC-MCNC: 91 MG/DL
LYMPHOCYTES # BLD: 2.2 K/UL (ref 1–5.1)
LYMPHOCYTES NFR BLD: 26.3 %
MCH RBC QN AUTO: 32.4 PG (ref 26–34)
MCHC RBC AUTO-ENTMCNC: 34.4 G/DL (ref 31–36)
MCV RBC AUTO: 94.1 FL (ref 80–100)
MONOCYTES # BLD: 0.9 K/UL (ref 0–1.3)
MONOCYTES NFR BLD: 10.6 %
NEUTROPHILS # BLD: 4.8 K/UL (ref 1.7–7.7)
NEUTROPHILS NFR BLD: 57.8 %
PLATELET # BLD AUTO: 288 K/UL (ref 135–450)
PMV BLD AUTO: 8.6 FL (ref 5–10.5)
POTASSIUM SERPL-SCNC: 3.9 MMOL/L (ref 3.5–5.1)
PROT SERPL-MCNC: 7.3 G/DL (ref 6.4–8.2)
PSA SERPL DL<=0.01 NG/ML-MCNC: 0.7 NG/ML (ref 0–4)
RBC # BLD AUTO: 5.2 M/UL (ref 4.2–5.9)
SODIUM SERPL-SCNC: 142 MMOL/L (ref 136–145)
TIBC SERPL-MCNC: 325 UG/DL (ref 260–445)
TRIGL SERPL-MCNC: 84 MG/DL (ref 0–150)
VLDLC SERPL CALC-MCNC: 17 MG/DL
WBC # BLD AUTO: 8.4 K/UL (ref 4–11)

## 2025-01-24 NOTE — PROGRESS NOTES
CARDIOLOGY OFFICE NOTE       Patient Name: Billy Hung  Primary Care physician: Giana Richards APRN - CNP    Reason for Referral/Chief Complaint: Billy Hung is a 79 y.o. patient who presents today for a cardiology follow up.     No chief complaint on file.     History of Present Illness:   Billy Hung is a 79 y.o. male with a medical history notable for HTN, HLD and DM II.   OV 10/24/23. New patient after ED. Stated he was driving when he experienced chest pain. Patient stated he had pain in his abdomin.    OV 1/30/24 he comes in feeling well. He states he went to give blood in January and was tachycardic. He states he was nervous. Otherwise, reports no further episodes of chest pain and is doing/feeling well. Reports no myalgias with crestor 5mg three times weekly despite prior hx of statin intolerance.             The patient denies chest pain, shortness of breath at rest and dyspnea with exertion. Denies palpitations, dizziness, near-syncope or olivia syncope. Denies paroxysmal nocturnal dyspnea, orthopnea, bendopnea, increasing lower extremity edema or weight gain.      Past Medical History:   has a past medical history of Arthritis, Geller esophagus, Hyperlipidemia, Hypertension, Leg cramps, Osteoarthritis, Polyp of colon, and Ringing in ears.    Surgical History:   has a past surgical history that includes Toe Surgery (Bilateral, 2011); Colonoscopy (2010); Colonoscopy (08/01/2016); Upper gastrointestinal endoscopy (04/09/2012); Upper gastrointestinal endoscopy (2018); Upper gastrointestinal endoscopy (2014); and Esophagogastroduodenoscopy (02/2024).     Social History:   reports that he has never smoked. He has never used smokeless tobacco. He reports current alcohol use of about 5.0 standard drinks of alcohol per week. He reports that he does not use drugs.     Family History:  family history includes Heart Attack in his father; Heart Disease in his father; High Cholesterol in his father;

## 2025-01-28 NOTE — PROGRESS NOTES
CARDIOLOGY OFFICE NOTE       Patient Name: Billy Hung  Primary Care physician: Giana Richards APRN - CNP    Reason for Referral/Chief Complaint: Billy Hung is a 79 y.o. patient who presents today for a cardiology follow up.     Chief Complaint   Patient presents with    Follow-up    Hyperlipidemia    Hypertension      History of Present Illness:   Billy Hung is a very pleasant 79 y.o. male with a medical history notable for HTN, HLD and DM II.   OV 10/24/23. New patient after ED. Stated he was driving when he experienced chest pain. Patient stated he had pain in his abdomin.    OV 1/30/24 he comes in feeling well. He states he went to give blood in January and was tachycardic. He states he was nervous. Otherwise, reports no further episodes of chest pain and is doing/feeling well. Reports no myalgias with crestor 5mg three times weekly despite prior hx of statin intolerance.   Today, 1/29/25, he reports he is doing well. He states he had his lab work completed last week. Patient denies current edema, chest pain, shortness of breath, palpitations, dizziness or syncope.  He reports he monitors his blood pressure at home, and reports it is similar to the reading today. He is taking his medications as prescribed, and tolerating them well. He has not had myalgia with Crestor 5 mg 5 days per week.  Reports he is willing to increase Crestor to once daily/every day of the week.    The patient denies chest pain, shortness of breath at rest and dyspnea with exertion. Denies palpitations, dizziness, near-syncope or olivia syncope. Denies paroxysmal nocturnal dyspnea, orthopnea, bendopnea, increasing lower extremity edema or weight gain.      Past Medical History:   has a past medical history of Arthritis, Geller esophagus, Hyperlipidemia, Hypertension, Leg cramps, Osteoarthritis, Polyp of colon, and Ringing in ears.    Surgical History:   has a past surgical history that includes Toe Surgery (Bilateral,

## 2025-01-29 ENCOUNTER — OFFICE VISIT (OUTPATIENT)
Dept: CARDIOLOGY CLINIC | Age: 80
End: 2025-01-29
Payer: COMMERCIAL

## 2025-01-29 VITALS
BODY MASS INDEX: 33.33 KG/M2 | HEART RATE: 92 BPM | SYSTOLIC BLOOD PRESSURE: 124 MMHG | WEIGHT: 225 LBS | HEIGHT: 69 IN | OXYGEN SATURATION: 97 % | DIASTOLIC BLOOD PRESSURE: 82 MMHG

## 2025-01-29 DIAGNOSIS — I71.21 ASCENDING AORTIC ANEURYSM, UNSPECIFIED WHETHER RUPTURED (HCC): ICD-10-CM

## 2025-01-29 DIAGNOSIS — E11.59 HYPERTENSION ASSOCIATED WITH TYPE 2 DIABETES MELLITUS (HCC): Primary | ICD-10-CM

## 2025-01-29 DIAGNOSIS — E78.5 HYPERLIPIDEMIA, UNSPECIFIED HYPERLIPIDEMIA TYPE: ICD-10-CM

## 2025-01-29 DIAGNOSIS — I15.2 HYPERTENSION ASSOCIATED WITH TYPE 2 DIABETES MELLITUS (HCC): Primary | ICD-10-CM

## 2025-01-29 PROCEDURE — 1123F ACP DISCUSS/DSCN MKR DOCD: CPT | Performed by: STUDENT IN AN ORGANIZED HEALTH CARE EDUCATION/TRAINING PROGRAM

## 2025-01-29 PROCEDURE — 93000 ELECTROCARDIOGRAM COMPLETE: CPT | Performed by: STUDENT IN AN ORGANIZED HEALTH CARE EDUCATION/TRAINING PROGRAM

## 2025-01-29 PROCEDURE — 1159F MED LIST DOCD IN RCRD: CPT | Performed by: STUDENT IN AN ORGANIZED HEALTH CARE EDUCATION/TRAINING PROGRAM

## 2025-01-29 PROCEDURE — 3074F SYST BP LT 130 MM HG: CPT | Performed by: STUDENT IN AN ORGANIZED HEALTH CARE EDUCATION/TRAINING PROGRAM

## 2025-01-29 PROCEDURE — 3079F DIAST BP 80-89 MM HG: CPT | Performed by: STUDENT IN AN ORGANIZED HEALTH CARE EDUCATION/TRAINING PROGRAM

## 2025-01-29 PROCEDURE — 99214 OFFICE O/P EST MOD 30 MIN: CPT | Performed by: STUDENT IN AN ORGANIZED HEALTH CARE EDUCATION/TRAINING PROGRAM

## 2025-01-29 RX ORDER — ROSUVASTATIN CALCIUM 5 MG/1
5 TABLET, COATED ORAL DAILY
Qty: 90 TABLET | Refills: 3 | Status: SHIPPED | OUTPATIENT
Start: 2025-01-29

## 2025-01-29 NOTE — PATIENT INSTRUCTIONS
PLAN  Increase rosuvastatin (Crestor) to 5 mg daily.   Order Echocardiogram to view size and strength of the heart    Continue prescribed medications as ordered.     Follow up with me in 1 year.

## 2025-03-12 ENCOUNTER — HOSPITAL ENCOUNTER (OUTPATIENT)
Dept: CARDIOLOGY | Age: 80
Discharge: HOME | End: 2025-03-14
Attending: STUDENT IN AN ORGANIZED HEALTH CARE EDUCATION/TRAINING PROGRAM
Payer: COMMERCIAL

## 2025-03-12 VITALS
WEIGHT: 225 LBS | DIASTOLIC BLOOD PRESSURE: 78 MMHG | BODY MASS INDEX: 33.33 KG/M2 | HEIGHT: 69 IN | SYSTOLIC BLOOD PRESSURE: 118 MMHG

## 2025-03-12 DIAGNOSIS — I71.21 ASCENDING AORTIC ANEURYSM, UNSPECIFIED WHETHER RUPTURED: ICD-10-CM

## 2025-03-12 LAB
ECHO AO ASC DIAM: 3.8 CM
ECHO AO ASCENDING AORTA INDEX: 1.75 CM/M2
ECHO AO ROOT DIAM: 3.1 CM
ECHO AO ROOT INDEX: 1.43 CM/M2
ECHO AV CUSP MM: 1.8 CM
ECHO AV PEAK GRADIENT: 8 MMHG
ECHO AV PEAK GRADIENT: 8 MMHG
ECHO AV PEAK VELOCITY: 1.4 M/S
ECHO AV VELOCITY RATIO: 0.71
ECHO BSA: 2.23 M2
ECHO EST RA PRESSURE: 8 MMHG
ECHO LA AREA 2C: 14.1 CM2
ECHO LA AREA 4C: 17.9 CM2
ECHO LA DIAMETER INDEX: 1.52 CM/M2
ECHO LA DIAMETER: 3.3 CM
ECHO LA MAJOR AXIS: 5 CM
ECHO LA MINOR AXIS: 4.7 CM
ECHO LA TO AORTIC ROOT RATIO: 1.06
ECHO LA VOL BP: 43 ML (ref 18–58)
ECHO LA VOL MOD A2C: 35 ML (ref 18–58)
ECHO LA VOL MOD A4C: 51 ML (ref 18–58)
ECHO LA VOL/BSA BIPLANE: 20 ML/M2 (ref 16–34)
ECHO LA VOLUME INDEX MOD A2C: 16 ML/M2 (ref 16–34)
ECHO LA VOLUME INDEX MOD A4C: 24 ML/M2 (ref 16–34)
ECHO LV E' LATERAL VELOCITY: 10.1 CM/S
ECHO LV E' SEPTAL VELOCITY: 9.14 CM/S
ECHO LV EDV 3D: 146 ML
ECHO LV EDV INDEX 3D: 67 ML/M2
ECHO LV EF PHYSICIAN: 55 %
ECHO LV EJECTION FRACTION 3D: 62 %
ECHO LV ESV 3D: 55 ML
ECHO LV ESV INDEX 3D: 25 ML/M2
ECHO LV FRACTIONAL SHORTENING: 35 % (ref 28–44)
ECHO LV GLOBAL LONGITUDINAL STRAIN (GLS): -14.1 %
ECHO LV GLOBAL LONGITUDINAL STRAIN (GLS): -14.6 %
ECHO LV GLOBAL LONGITUDINAL STRAIN (GLS): -14.9 %
ECHO LV GLOBAL LONGITUDINAL STRAIN (GLS): -15 %
ECHO LV INTERNAL DIMENSION DIASTOLE INDEX: 2.26 CM/M2
ECHO LV INTERNAL DIMENSION DIASTOLIC: 4.9 CM (ref 4.2–5.9)
ECHO LV INTERNAL DIMENSION SYSTOLIC INDEX: 1.47 CM/M2
ECHO LV INTERNAL DIMENSION SYSTOLIC: 3.2 CM
ECHO LV ISOVOLUMETRIC RELAXATION TIME (IVRT): 61 MS
ECHO LV IVSD: 0.9 CM (ref 0.6–1)
ECHO LV MASS 2D: 153 G (ref 88–224)
ECHO LV MASS 3D INDEX: 59.9 G/M2
ECHO LV MASS 3D: 130 G
ECHO LV MASS INDEX 2D: 70.5 G/M2 (ref 49–115)
ECHO LV POSTERIOR WALL DIASTOLIC: 0.9 CM (ref 0.6–1)
ECHO LV RELATIVE WALL THICKNESS RATIO: 0.37
ECHO LVOT MEAN GRADIENT: 2 MMHG
ECHO LVOT PEAK GRADIENT: 4 MMHG
ECHO LVOT PEAK VELOCITY: 1 M/S
ECHO LVOT VTI: 20.7 CM
ECHO MV A VELOCITY: 1.13 M/S
ECHO MV E DECELERATION TIME (DT): 198 MS
ECHO MV E VELOCITY: 0.77 M/S
ECHO MV E/A RATIO: 0.68
ECHO MV E/E' LATERAL: 7.62
ECHO MV E/E' RATIO (AVERAGED): 8.02
ECHO MV E/E' SEPTAL: 8.42
ECHO PULMONARY ARTERY END DIASTOLIC PRESSURE: 3 MMHG
ECHO PV MAX VELOCITY: 0.9 M/S
ECHO RA AREA 4C: 11.5 CM2
ECHO RA END SYSTOLIC VOLUME APICAL 4 CHAMBER INDEX BSA: 11 ML/M2
ECHO RA VOLUME: 24 ML
ECHO RIGHT VENTRICULAR SYSTOLIC PRESSURE (RVSP): 26 MMHG
ECHO RV FREE WALL PEAK S': 13.6 CM/S
ECHO RV TAPSE: 1.8 CM (ref 1.7–?)
ECHO TV REGURGITANT MAX VELOCITY: 2.1 M/S
ECHO TV REGURGITANT PEAK GRADIENT: 18 MMHG

## 2025-03-12 PROCEDURE — 93306 TTE W/DOPPLER COMPLETE: CPT

## 2025-03-13 ENCOUNTER — RESULTS FOLLOW-UP (OUTPATIENT)
Dept: CARDIOLOGY | Age: 80
End: 2025-03-13

## 2025-03-17 DIAGNOSIS — I10 ESSENTIAL HYPERTENSION: ICD-10-CM

## 2025-03-17 NOTE — TELEPHONE ENCOUNTER
Refill Request     CONFIRM preferred pharmacy with the patient.    If Mail Order Rx - Pend for 90 day refill.      Last Seen: Last Seen Department: 12/19/2024  Last Seen by PCP: 12/19/2024    Last Written: 4/22/2024    If no future appointment scheduled:  Review the last OV with PCP and review information for follow-up visit,  Route STAFF MESSAGE with patient name to the  Pool for scheduling with the following information:            -  Timing of next visit           -  Visit type ie Physical, OV, etc           -  Diagnoses/Reason ie. COPD, HTN - Do not use MEDICATION, Follow-up or CHECK UP - Give reason for visit      Next Appointment:   Future Appointments   Date Time Provider Department Center   6/19/2025  9:00 AM Giana Richards, JAY JAY - CNP [facility]       Message sent to  to schedule appt with patient?  NO      Requested Prescriptions     Pending Prescriptions Disp Refills    NIFEdipine (ADALAT CC) 90 MG extended release tablet [Pharmacy Med Name: NIFEDIPINE ER 90 MG TABLET] 90 tablet 3     Sig: TAKE 1 TABLET BY MOUTH EVERY DAY

## 2025-03-19 RX ORDER — NIFEDIPINE 90 MG/1
90 TABLET, FILM COATED, EXTENDED RELEASE ORAL DAILY
Qty: 90 TABLET | Refills: 3 | Status: SHIPPED | OUTPATIENT
Start: 2025-03-19

## 2025-03-21 DIAGNOSIS — M54.50 ACUTE MIDLINE LOW BACK PAIN WITHOUT SCIATICA: ICD-10-CM

## 2025-03-21 RX ORDER — METHOCARBAMOL 750 MG/1
750 TABLET, FILM COATED ORAL 3 TIMES DAILY PRN
Qty: 30 TABLET | Refills: 1 | Status: SHIPPED | OUTPATIENT
Start: 2025-03-21

## 2025-03-21 NOTE — TELEPHONE ENCOUNTER
Refill Request     CONFIRM preferred pharmacy with the patient.    If Mail Order Rx - Pend for 90 day refill.      Last Seen: Last Seen Department: 12/19/2024  Last Seen by PCP: 12/19/2024    Last Written: 10/3/2024    If no future appointment scheduled:  Review the last OV with PCP and review information for follow-up visit,  Route STAFF MESSAGE with patient name to the  Pool for scheduling with the following information:            -  Timing of next visit           -  Visit type ie Physical, OV, etc           -  Diagnoses/Reason ie. COPD, HTN - Do not use MEDICATION, Follow-up or CHECK UP - Give reason for visit      Next Appointment:   Future Appointments   Date Time Provider Department Center   6/19/2025  9:00 AM Giana Richards, JAY JAY - CNP [facility]       Message sent to  to schedule appt with patient?  NO      Requested Prescriptions     Pending Prescriptions Disp Refills    methocarbamol (ROBAXIN) 750 MG tablet [Pharmacy Med Name: METHOCARBAMOL 750 MG TABLET] 30 tablet 1     Sig: TAKE 1 TABLET BY MOUTH 3 TIMES DAILY AS NEEDED (BACK PAIN)

## 2025-06-16 SDOH — ECONOMIC STABILITY: FOOD INSECURITY: WITHIN THE PAST 12 MONTHS, YOU WORRIED THAT YOUR FOOD WOULD RUN OUT BEFORE YOU GOT MONEY TO BUY MORE.: NEVER TRUE

## 2025-06-16 SDOH — ECONOMIC STABILITY: INCOME INSECURITY: IN THE LAST 12 MONTHS, WAS THERE A TIME WHEN YOU WERE NOT ABLE TO PAY THE MORTGAGE OR RENT ON TIME?: NO

## 2025-06-16 SDOH — HEALTH STABILITY: PHYSICAL HEALTH: ON AVERAGE, HOW MANY DAYS PER WEEK DO YOU ENGAGE IN MODERATE TO STRENUOUS EXERCISE (LIKE A BRISK WALK)?: 5 DAYS

## 2025-06-16 SDOH — ECONOMIC STABILITY: FOOD INSECURITY: WITHIN THE PAST 12 MONTHS, THE FOOD YOU BOUGHT JUST DIDN'T LAST AND YOU DIDN'T HAVE MONEY TO GET MORE.: NEVER TRUE

## 2025-06-16 SDOH — HEALTH STABILITY: PHYSICAL HEALTH: ON AVERAGE, HOW MANY MINUTES DO YOU ENGAGE IN EXERCISE AT THIS LEVEL?: 10 MIN

## 2025-06-16 ASSESSMENT — LIFESTYLE VARIABLES
HOW OFTEN DURING THE LAST YEAR HAVE YOU FAILED TO DO WHAT WAS NORMALLY EXPECTED FROM YOU BECAUSE OF DRINKING: NEVER
HAS A RELATIVE, FRIEND, DOCTOR, OR ANOTHER HEALTH PROFESSIONAL EXPRESSED CONCERN ABOUT YOUR DRINKING OR SUGGESTED YOU CUT DOWN: NO
HOW OFTEN DURING THE LAST YEAR HAVE YOU FOUND THAT YOU WERE NOT ABLE TO STOP DRINKING ONCE YOU HAD STARTED: NEVER
HAVE YOU OR SOMEONE ELSE BEEN INJURED AS A RESULT OF YOUR DRINKING: NO
HOW OFTEN DURING THE LAST YEAR HAVE YOU BEEN UNABLE TO REMEMBER WHAT HAPPENED THE NIGHT BEFORE BECAUSE YOU HAD BEEN DRINKING: NEVER
HOW MANY STANDARD DRINKS CONTAINING ALCOHOL DO YOU HAVE ON A TYPICAL DAY: 1 OR 2
HOW MANY STANDARD DRINKS CONTAINING ALCOHOL DO YOU HAVE ON A TYPICAL DAY: 1
HAS A RELATIVE, FRIEND, DOCTOR, OR ANOTHER HEALTH PROFESSIONAL EXPRESSED CONCERN ABOUT YOUR DRINKING OR SUGGESTED YOU CUT DOWN: NO
HOW OFTEN DO YOU HAVE A DRINK CONTAINING ALCOHOL: 5
HOW OFTEN DURING THE LAST YEAR HAVE YOU FOUND THAT YOU WERE NOT ABLE TO STOP DRINKING ONCE YOU HAD STARTED: NEVER
HOW OFTEN DO YOU HAVE SIX OR MORE DRINKS ON ONE OCCASION: 1
HAVE YOU OR SOMEONE ELSE BEEN INJURED AS A RESULT OF YOUR DRINKING: NO
HOW OFTEN DURING THE LAST YEAR HAVE YOU FAILED TO DO WHAT WAS NORMALLY EXPECTED FROM YOU BECAUSE OF DRINKING: NEVER
HOW OFTEN DO YOU HAVE A DRINK CONTAINING ALCOHOL: 4 OR MORE TIMES A WEEK
HOW OFTEN DURING THE LAST YEAR HAVE YOU BEEN UNABLE TO REMEMBER WHAT HAPPENED THE NIGHT BEFORE BECAUSE YOU HAD BEEN DRINKING: NEVER
HOW OFTEN DURING THE LAST YEAR HAVE YOU HAD A FEELING OF GUILT OR REMORSE AFTER DRINKING: NEVER
HOW OFTEN DURING THE LAST YEAR HAVE YOU NEEDED AN ALCOHOLIC DRINK FIRST THING IN THE MORNING TO GET YOURSELF GOING AFTER A NIGHT OF HEAVY DRINKING: NEVER
HOW OFTEN DURING THE LAST YEAR HAVE YOU NEEDED AN ALCOHOLIC DRINK FIRST THING IN THE MORNING TO GET YOURSELF GOING AFTER A NIGHT OF HEAVY DRINKING: NEVER
HOW OFTEN DURING THE LAST YEAR HAVE YOU HAD A FEELING OF GUILT OR REMORSE AFTER DRINKING: NEVER

## 2025-06-16 ASSESSMENT — PATIENT HEALTH QUESTIONNAIRE - PHQ9
SUM OF ALL RESPONSES TO PHQ QUESTIONS 1-9: 0
2. FEELING DOWN, DEPRESSED OR HOPELESS: NOT AT ALL
SUM OF ALL RESPONSES TO PHQ QUESTIONS 1-9: 0
SUM OF ALL RESPONSES TO PHQ QUESTIONS 1-9: 0
1. LITTLE INTEREST OR PLEASURE IN DOING THINGS: NOT AT ALL
SUM OF ALL RESPONSES TO PHQ QUESTIONS 1-9: 0

## 2025-06-19 ENCOUNTER — OFFICE VISIT (OUTPATIENT)
Dept: FAMILY MEDICINE CLINIC | Age: 80
End: 2025-06-19
Payer: COMMERCIAL

## 2025-06-19 VITALS
SYSTOLIC BLOOD PRESSURE: 120 MMHG | HEIGHT: 70 IN | DIASTOLIC BLOOD PRESSURE: 62 MMHG | OXYGEN SATURATION: 94 % | BODY MASS INDEX: 31.92 KG/M2 | WEIGHT: 223 LBS | HEART RATE: 86 BPM

## 2025-06-19 DIAGNOSIS — E78.5 HYPERLIPIDEMIA DUE TO TYPE 2 DIABETES MELLITUS (HCC): ICD-10-CM

## 2025-06-19 DIAGNOSIS — E11.9 TYPE 2 DIABETES MELLITUS WITHOUT COMPLICATION, WITHOUT LONG-TERM CURRENT USE OF INSULIN (HCC): ICD-10-CM

## 2025-06-19 DIAGNOSIS — Z00.00 MEDICARE ANNUAL WELLNESS VISIT, SUBSEQUENT: ICD-10-CM

## 2025-06-19 DIAGNOSIS — Z00.00 MEDICARE ANNUAL WELLNESS VISIT, SUBSEQUENT: Primary | ICD-10-CM

## 2025-06-19 DIAGNOSIS — E11.69 HYPERLIPIDEMIA DUE TO TYPE 2 DIABETES MELLITUS (HCC): ICD-10-CM

## 2025-06-19 DIAGNOSIS — I71.21 ANEURYSM OF ASCENDING AORTA WITHOUT RUPTURE: ICD-10-CM

## 2025-06-19 PROCEDURE — 3078F DIAST BP <80 MM HG: CPT | Performed by: NURSE PRACTITIONER

## 2025-06-19 PROCEDURE — 1160F RVW MEDS BY RX/DR IN RCRD: CPT | Performed by: NURSE PRACTITIONER

## 2025-06-19 PROCEDURE — G0439 PPPS, SUBSEQ VISIT: HCPCS | Performed by: NURSE PRACTITIONER

## 2025-06-19 PROCEDURE — 1159F MED LIST DOCD IN RCRD: CPT | Performed by: NURSE PRACTITIONER

## 2025-06-19 PROCEDURE — 1123F ACP DISCUSS/DSCN MKR DOCD: CPT | Performed by: NURSE PRACTITIONER

## 2025-06-19 PROCEDURE — 3074F SYST BP LT 130 MM HG: CPT | Performed by: NURSE PRACTITIONER

## 2025-06-19 NOTE — PROGRESS NOTES
Medicare Annual Wellness Visit    Billy Hung is here for Medicare AWV    Assessment & Plan  1. Medicare wellness visit.  - Weight has decreased by 4 pounds since 06/2024, indicating a loss of approximately 2 pounds every six months.  - Blood pressure readings are within the normal range today.  - Advised to persist with dietary modifications.  - Re-evaluation of cholesterol levels will be conducted today.  Medicare annual wellness visit, subsequent  -     Lipid Panel; Future  -     Comprehensive Metabolic Panel; Future  Type 2 diabetes mellitus without complication, without long-term current use of insulin (HCC)  -     Hemoglobin A1C; Future  Hyperlipidemia due to type 2 diabetes mellitus (HCC)  Aneurysm of ascending aorta without rupture    Results  Labs   - Complete Blood Count (CBC): 01/24/2025, Red cells, white cells, and platelets were all normal   - PSA: 01/24/2025, Normal   - Iron Count: 01/24/2025, Normal     Return in about 6 months (around 12/19/2025) for HTN, hyperlipidemia, diabetes.     Subjective     History of Present Illness  The patient presents for a Medicare wellness visit and routine checkup.    He reports no new health concerns since his last visit. He followed up with his cardiologist on 01/29/2025 and had labs done on 01/24/2025. His cardiologist adjusted his cholesterol medication from 5 days a week to daily to achieve a target LDL cholesterol level closer to 70. He is fasting today and is open to undergoing blood tests. His weight has remained stable at 223 pounds for the past year, despite efforts to incorporate more salads into his diet and reduce his overall food intake. He maintains an active lifestyle, engaging in volunteer work.    He is scheduled for an ophthalmology appointment next month and has been taking a vision vitamin for the past 2 years, which has resulted in an improvement in the brightness of his blood vessels. He undergoes annual eye examinations at [facility]. He

## 2025-06-19 NOTE — PATIENT INSTRUCTIONS
neck, jaw, or upper belly or in one or both shoulders or arms.     Lightheadedness or sudden weakness.     A fast or irregular heartbeat.   After you call 911, the  may tell you to chew 1 adult-strength or 2 to 4 low-dose aspirin. Wait for an ambulance. Do not try to drive yourself.  Watch closely for changes in your health, and be sure to contact your doctor if you have any problems.  Where can you learn more?  Go to https://www.[employer].net/patientEd and enter F075 to learn more about \"A Healthy Heart: Care Instructions.\"  Current as of: July 31, 2024  Content Version: 14.5  © 0896-3153 [employer].   Care instructions adapted under license by [employer]. If you have questions about a medical condition or this instruction, always ask your healthcare professional. [employer] disclaims any warranty or liability for your use of this information.    Personalized Preventive Plan for Billy Hung - 6/19/2025  Medicare offers a range of preventive health benefits. Some of the tests and screenings are paid in full while other may be subject to a deductible, co-insurance, and/or copay.  Some of these benefits include a comprehensive review of your medical history including lifestyle, illnesses that may run in your family, and various assessments and screenings as appropriate.  After reviewing your medical record and screening and assessments performed today your provider may have ordered immunizations, labs, imaging, and/or referrals for you.  A list of these orders (if applicable) as well as your Preventive Care list are included within your After Visit Summary for your review.

## 2025-06-20 ENCOUNTER — RESULTS FOLLOW-UP (OUTPATIENT)
Dept: FAMILY MEDICINE CLINIC | Age: 80
End: 2025-06-20

## 2025-06-20 LAB
ALBUMIN SERPL-MCNC: 4.7 G/DL (ref 3.4–5)
ALBUMIN/GLOB SERPL: 1.7 {RATIO} (ref 1.1–2.2)
ALP SERPL-CCNC: 63 U/L (ref 40–129)
ALT SERPL-CCNC: 36 U/L (ref 10–40)
ANION GAP SERPL CALCULATED.3IONS-SCNC: 15 MMOL/L (ref 3–16)
AST SERPL-CCNC: 31 U/L (ref 15–37)
BILIRUB SERPL-MCNC: 0.8 MG/DL (ref 0–1)
BUN SERPL-MCNC: 21 MG/DL (ref 7–20)
CALCIUM SERPL-MCNC: 10 MG/DL (ref 8.3–10.6)
CHLORIDE SERPL-SCNC: 100 MMOL/L (ref 99–110)
CHOLEST SERPL-MCNC: 176 MG/DL (ref 0–199)
CO2 SERPL-SCNC: 26 MMOL/L (ref 21–32)
CREAT SERPL-MCNC: 1.2 MG/DL (ref 0.8–1.3)
EST. AVERAGE GLUCOSE BLD GHB EST-MCNC: 122.6 MG/DL
GFR SERPLBLD CREATININE-BSD FMLA CKD-EPI: 62 ML/MIN/{1.73_M2}
GLUCOSE SERPL-MCNC: 117 MG/DL (ref 70–99)
HBA1C MFR BLD: 5.9 %
HDLC SERPL-MCNC: 68 MG/DL (ref 40–60)
LDLC SERPL CALC-MCNC: 88 MG/DL
POTASSIUM SERPL-SCNC: 4 MMOL/L (ref 3.5–5.1)
PROT SERPL-MCNC: 7.5 G/DL (ref 6.4–8.2)
SODIUM SERPL-SCNC: 141 MMOL/L (ref 136–145)
TRIGL SERPL-MCNC: 100 MG/DL (ref 0–150)
VLDLC SERPL CALC-MCNC: 20 MG/DL

## 2025-07-20 DIAGNOSIS — L30.9 DERMATITIS: ICD-10-CM

## 2025-07-20 DIAGNOSIS — M54.50 ACUTE MIDLINE LOW BACK PAIN WITHOUT SCIATICA: ICD-10-CM

## 2025-07-21 RX ORDER — TRIAMCINOLONE ACETONIDE 1 MG/G
CREAM TOPICAL 2 TIMES DAILY
Qty: 30 G | Refills: 1 | Status: SHIPPED | OUTPATIENT
Start: 2025-07-21

## 2025-07-21 RX ORDER — METHOCARBAMOL 750 MG/1
750 TABLET, FILM COATED ORAL 3 TIMES DAILY PRN
Qty: 30 TABLET | Refills: 1 | Status: SHIPPED | OUTPATIENT
Start: 2025-07-21

## 2025-07-21 NOTE — TELEPHONE ENCOUNTER
Refill Request     CONFIRM preferred pharmacy with the patient.    If Mail Order Rx - Pend for 90 day refill.      Last Seen: Last Seen Department: 6/19/2025  Last Seen by PCP: Visit date not found    Last Written: 3/21/25 30 with 1 refills     If no future appointment scheduled:  Review the last OV with PCP and review information for follow-up visit,  Route STAFF MESSAGE with patient name to the  Pool for scheduling with the following information:            -  Timing of next visit           -  Visit type ie Physical, OV, etc           -  Diagnoses/Reason ie. COPD, HTN - Do not use MEDICATION, Follow-up or CHECK UP - Give reason for visit      Next Appointment:   Future Appointments   Date Time Provider Department Center   12/19/2025  8:00 AM Giana Richards, JAY JAY - CNP [facility]       Message sent to  to schedule appt with patient?  NO      Requested Prescriptions     Pending Prescriptions Disp Refills    methocarbamol (ROBAXIN) 750 MG tablet [Pharmacy Med Name: METHOCARBAMOL 750 MG TABLET] 30 tablet 1     Sig: TAKE 1 TABLET BY MOUTH 3 TIMES DAILY AS NEEDED (BACK PAIN)

## 2025-07-21 NOTE — TELEPHONE ENCOUNTER
.Refill Request     CONFIRM preferred pharmacy with the patient.    If Mail Order Rx - Pend for 90 day refill.      Last Seen: Last Seen Department: 6/19/2025  Last Seen by PCP: 6/19/2025    Last Written: 6-14-24 30 g with 1     If no future appointment scheduled:  Review the last OV with PCP and review information for follow-up visit,  Route STAFF MESSAGE with patient name to the  Pool for scheduling with the following information:            -  Timing of next visit           -  Visit type ie Physical, OV, etc           -  Diagnoses/Reason ie. COPD, HTN - Do not use MEDICATION, Follow-up or CHECK UP - Give reason for visit      Next Appointment:   Future Appointments   Date Time Provider Department Center   12/19/2025  8:00 AM Giana Richards, JAY JAY - CNP [facility] [place] [facility]       Message sent to  to schedule appt with patient?  NO      Requested Prescriptions     Pending Prescriptions Disp Refills    triamcinolone (KENALOG) 0.1 % cream [Pharmacy Med Name: TRIAMCINOLONE 0.1% CREAM] 30 g 1     Sig: APPLY TO AFFECTED AREA TWICE A DAY

## 2025-07-31 ENCOUNTER — PATIENT MESSAGE (OUTPATIENT)
Dept: FAMILY MEDICINE CLINIC | Age: 80
End: 2025-07-31

## 2025-07-31 DIAGNOSIS — H53.9 VISION CHANGES: Primary | ICD-10-CM

## 2025-07-31 NOTE — TELEPHONE ENCOUNTER
I placed a referral to I and used Dr. Alcazar's name however he can see anyone in the group. Please give him the number to call to schedule